# Patient Record
Sex: FEMALE | Race: WHITE | NOT HISPANIC OR LATINO
[De-identification: names, ages, dates, MRNs, and addresses within clinical notes are randomized per-mention and may not be internally consistent; named-entity substitution may affect disease eponyms.]

---

## 2017-04-04 ENCOUNTER — LABORATORY RESULT (OUTPATIENT)
Age: 57
End: 2017-04-04

## 2017-04-04 ENCOUNTER — APPOINTMENT (OUTPATIENT)
Dept: INTERNAL MEDICINE | Facility: CLINIC | Age: 57
End: 2017-04-04

## 2017-04-04 ENCOUNTER — NON-APPOINTMENT (OUTPATIENT)
Age: 57
End: 2017-04-04

## 2017-04-04 VITALS
OXYGEN SATURATION: 97 % | DIASTOLIC BLOOD PRESSURE: 80 MMHG | HEART RATE: 80 BPM | TEMPERATURE: 98 F | SYSTOLIC BLOOD PRESSURE: 130 MMHG | BODY MASS INDEX: 31.98 KG/M2 | HEIGHT: 68 IN | WEIGHT: 211 LBS

## 2017-04-04 DIAGNOSIS — Z87.42 PERSONAL HISTORY OF OTHER DISEASES OF THE FEMALE GENITAL TRACT: ICD-10-CM

## 2017-04-05 LAB
25(OH)D3 SERPL-MCNC: 18.4 NG/ML
ALBUMIN SERPL ELPH-MCNC: 4.7 G/DL
ALP BLD-CCNC: 89 U/L
ALT SERPL-CCNC: 35 U/L
ANION GAP SERPL CALC-SCNC: 15 MMOL/L
APPEARANCE: CLEAR
AST SERPL-CCNC: 33 U/L
BASOPHILS # BLD AUTO: 0.03 K/UL
BASOPHILS NFR BLD AUTO: 0.7 %
BILIRUB SERPL-MCNC: 0.8 MG/DL
BILIRUBIN URINE: NEGATIVE
BLOOD URINE: NEGATIVE
BUN SERPL-MCNC: 18 MG/DL
CALCIUM SERPL-MCNC: 10.4 MG/DL
CHLORIDE SERPL-SCNC: 105 MMOL/L
CHOLEST SERPL-MCNC: 187 MG/DL
CHOLEST/HDLC SERPL: 2.7 RATIO
CK SERPL-CCNC: 75 U/L
CO2 SERPL-SCNC: 23 MMOL/L
COLOR: YELLOW
CREAT SERPL-MCNC: 0.71 MG/DL
EOSINOPHIL # BLD AUTO: 0.3 K/UL
EOSINOPHIL NFR BLD AUTO: 6.8 %
GLUCOSE QUALITATIVE U: NORMAL MG/DL
GLUCOSE SERPL-MCNC: 94 MG/DL
HBA1C MFR BLD HPLC: 5.4 %
HCT VFR BLD CALC: 43 %
HCV AB SER QL: NONREACTIVE
HCV S/CO RATIO: 0.23 S/CO
HDLC SERPL-MCNC: 69 MG/DL
HGB BLD-MCNC: 14.1 G/DL
IMM GRANULOCYTES NFR BLD AUTO: 0 %
KETONES URINE: NEGATIVE
LDLC SERPL CALC-MCNC: 94 MG/DL
LEUKOCYTE ESTERASE URINE: ABNORMAL
LYMPHOCYTES # BLD AUTO: 1.73 K/UL
LYMPHOCYTES NFR BLD AUTO: 39.2 %
MAN DIFF?: NORMAL
MCHC RBC-ENTMCNC: 31.4 PG
MCHC RBC-ENTMCNC: 32.8 GM/DL
MCV RBC AUTO: 95.8 FL
MONOCYTES # BLD AUTO: 0.23 K/UL
MONOCYTES NFR BLD AUTO: 5.2 %
NEUTROPHILS # BLD AUTO: 2.12 K/UL
NEUTROPHILS NFR BLD AUTO: 48.1 %
NITRITE URINE: NEGATIVE
PH URINE: 6.5
PLATELET # BLD AUTO: 181 K/UL
POTASSIUM SERPL-SCNC: 4.4 MMOL/L
PROT SERPL-MCNC: 7.2 G/DL
PROTEIN URINE: NEGATIVE MG/DL
RBC # BLD: 4.49 M/UL
RBC # FLD: 13.7 %
SODIUM SERPL-SCNC: 143 MMOL/L
SPECIFIC GRAVITY URINE: 1.03
TRIGL SERPL-MCNC: 118 MG/DL
TSH SERPL-ACNC: 1.89 UIU/ML
URATE SERPL-MCNC: 4.9 MG/DL
UROBILINOGEN URINE: 1 MG/DL
WBC # FLD AUTO: 4.41 K/UL

## 2018-12-21 ENCOUNTER — APPOINTMENT (OUTPATIENT)
Dept: INTERNAL MEDICINE | Facility: CLINIC | Age: 58
End: 2018-12-21
Payer: COMMERCIAL

## 2018-12-21 ENCOUNTER — LABORATORY RESULT (OUTPATIENT)
Age: 58
End: 2018-12-21

## 2018-12-21 ENCOUNTER — NON-APPOINTMENT (OUTPATIENT)
Age: 58
End: 2018-12-21

## 2018-12-21 VITALS
DIASTOLIC BLOOD PRESSURE: 86 MMHG | SYSTOLIC BLOOD PRESSURE: 130 MMHG | HEART RATE: 86 BPM | OXYGEN SATURATION: 97 % | TEMPERATURE: 98.1 F | HEIGHT: 68 IN | WEIGHT: 215 LBS | BODY MASS INDEX: 32.58 KG/M2

## 2018-12-21 PROCEDURE — 99396 PREV VISIT EST AGE 40-64: CPT | Mod: 25

## 2018-12-21 PROCEDURE — 94010 BREATHING CAPACITY TEST: CPT

## 2018-12-21 PROCEDURE — 93000 ELECTROCARDIOGRAM COMPLETE: CPT

## 2018-12-21 PROCEDURE — 82043 UR ALBUMIN QUANTITATIVE: CPT | Mod: QW

## 2018-12-21 PROCEDURE — 36415 COLL VENOUS BLD VENIPUNCTURE: CPT

## 2018-12-21 PROCEDURE — 92552 PURE TONE AUDIOMETRY AIR: CPT

## 2018-12-21 PROCEDURE — 90674 CCIIV4 VAC NO PRSV 0.5 ML IM: CPT

## 2018-12-21 PROCEDURE — G0008: CPT

## 2018-12-22 LAB
25(OH)D3 SERPL-MCNC: 30.2 NG/ML
ALBUMIN SERPL ELPH-MCNC: 4.9 G/DL
ALBUMIN: 10
ALP BLD-CCNC: 87 U/L
ALT SERPL-CCNC: 25 U/L
ANION GAP SERPL CALC-SCNC: 12 MMOL/L
APPEARANCE: CLEAR
AST SERPL-CCNC: 21 U/L
BASOPHILS # BLD AUTO: 0.02 K/UL
BASOPHILS NFR BLD AUTO: 0.4 %
BILIRUB SERPL-MCNC: 0.6 MG/DL
BILIRUBIN URINE: NEGATIVE
BLOOD URINE: NEGATIVE
BUN SERPL-MCNC: 20 MG/DL
CALCIUM SERPL-MCNC: 11 MG/DL
CHLORIDE SERPL-SCNC: 106 MMOL/L
CHOLEST SERPL-MCNC: 167 MG/DL
CHOLEST/HDLC SERPL: 2.7 RATIO
CK SERPL-CCNC: 74 U/L
CO2 SERPL-SCNC: 24 MMOL/L
COLOR: YELLOW
CREAT SERPL-MCNC: 0.69 MG/DL
CREATININE: 200
EOSINOPHIL # BLD AUTO: 0.21 K/UL
EOSINOPHIL NFR BLD AUTO: 4.2 %
GLUCOSE QUALITATIVE U: NEGATIVE MG/DL
GLUCOSE SERPL-MCNC: 98 MG/DL
HBA1C MFR BLD HPLC: 5.2 %
HCT VFR BLD CALC: 42 %
HDLC SERPL-MCNC: 62 MG/DL
HGB BLD-MCNC: 13.7 G/DL
IMM GRANULOCYTES NFR BLD AUTO: 0 %
KETONES URINE: NEGATIVE
LDLC SERPL CALC-MCNC: 85 MG/DL
LEUKOCYTE ESTERASE URINE: ABNORMAL
LYMPHOCYTES # BLD AUTO: 1.73 K/UL
LYMPHOCYTES NFR BLD AUTO: 34.8 %
MAN DIFF?: NORMAL
MCHC RBC-ENTMCNC: 30.9 PG
MCHC RBC-ENTMCNC: 32.6 GM/DL
MCV RBC AUTO: 94.8 FL
MICROALBUMIN/CREAT UR TEST STR-RTO: <30
MONOCYTES # BLD AUTO: 0.21 K/UL
MONOCYTES NFR BLD AUTO: 4.2 %
NEUTROPHILS # BLD AUTO: 2.8 K/UL
NEUTROPHILS NFR BLD AUTO: 56.4 %
NITRITE URINE: NEGATIVE
PH URINE: 5.5
PLATELET # BLD AUTO: 196 K/UL
POTASSIUM SERPL-SCNC: 4.2 MMOL/L
PROT SERPL-MCNC: 7.2 G/DL
PROTEIN URINE: NEGATIVE MG/DL
RBC # BLD: 4.43 M/UL
RBC # FLD: 13.3 %
SODIUM SERPL-SCNC: 142 MMOL/L
SPECIFIC GRAVITY URINE: 1.02
TRIGL SERPL-MCNC: 100 MG/DL
TSH SERPL-ACNC: 2.13 UIU/ML
URATE SERPL-MCNC: 4.5 MG/DL
UROBILINOGEN URINE: NEGATIVE MG/DL
WBC # FLD AUTO: 4.97 K/UL

## 2018-12-24 ENCOUNTER — CHART COPY (OUTPATIENT)
Age: 58
End: 2018-12-24

## 2018-12-26 ENCOUNTER — CHART COPY (OUTPATIENT)
Age: 58
End: 2018-12-26

## 2018-12-26 ENCOUNTER — CLINICAL ADVICE (OUTPATIENT)
Age: 58
End: 2018-12-26

## 2018-12-26 LAB
CALCIUM SERPL-MCNC: 11.4 MG/DL
PARATHYROID HORMONE INTACT: 54 PG/ML

## 2018-12-28 LAB
24R-OH-CALCIDIOL SERPL-MCNC: 57.2 PG/ML
25(OH)D3 SERPL-MCNC: 30 NG/ML

## 2019-01-02 LAB — PTH RELATED PROT SERPL-MCNC: <2 PMOL/L

## 2019-01-15 LAB — HEMOCCULT STL QL IA: NEGATIVE

## 2019-10-11 ENCOUNTER — APPOINTMENT (OUTPATIENT)
Dept: INTERNAL MEDICINE | Facility: CLINIC | Age: 59
End: 2019-10-11
Payer: COMMERCIAL

## 2019-10-11 VITALS
SYSTOLIC BLOOD PRESSURE: 130 MMHG | DIASTOLIC BLOOD PRESSURE: 86 MMHG | WEIGHT: 211 LBS | HEART RATE: 100 BPM | OXYGEN SATURATION: 97 % | TEMPERATURE: 98.1 F | HEIGHT: 68 IN | BODY MASS INDEX: 31.98 KG/M2

## 2019-10-11 PROCEDURE — 99215 OFFICE O/P EST HI 40 MIN: CPT

## 2020-01-24 ENCOUNTER — APPOINTMENT (OUTPATIENT)
Dept: INTERNAL MEDICINE | Facility: CLINIC | Age: 60
End: 2020-01-24
Payer: COMMERCIAL

## 2020-01-24 ENCOUNTER — LABORATORY RESULT (OUTPATIENT)
Age: 60
End: 2020-01-24

## 2020-01-24 ENCOUNTER — NON-APPOINTMENT (OUTPATIENT)
Age: 60
End: 2020-01-24

## 2020-01-24 VITALS
TEMPERATURE: 98.1 F | OXYGEN SATURATION: 96 % | WEIGHT: 216 LBS | HEIGHT: 68 IN | SYSTOLIC BLOOD PRESSURE: 130 MMHG | BODY MASS INDEX: 32.74 KG/M2 | DIASTOLIC BLOOD PRESSURE: 80 MMHG | HEART RATE: 100 BPM

## 2020-01-24 DIAGNOSIS — N63.0 UNSPECIFIED LUMP IN UNSPECIFIED BREAST: ICD-10-CM

## 2020-01-24 DIAGNOSIS — M19.90 UNSPECIFIED OSTEOARTHRITIS, UNSPECIFIED SITE: ICD-10-CM

## 2020-01-24 LAB
ALBUMIN: NORMAL
CREATININE: NORMAL
HBA1C MFR BLD HPLC: 5
MICROALBUMIN/CREAT UR TEST STR-RTO: NORMAL

## 2020-01-24 PROCEDURE — 92552 PURE TONE AUDIOMETRY AIR: CPT

## 2020-01-24 PROCEDURE — 83036 HEMOGLOBIN GLYCOSYLATED A1C: CPT | Mod: QW

## 2020-01-24 PROCEDURE — 94010 BREATHING CAPACITY TEST: CPT

## 2020-01-24 PROCEDURE — 93000 ELECTROCARDIOGRAM COMPLETE: CPT

## 2020-01-24 PROCEDURE — 82043 UR ALBUMIN QUANTITATIVE: CPT | Mod: QW

## 2020-01-24 PROCEDURE — 36415 COLL VENOUS BLD VENIPUNCTURE: CPT

## 2020-01-24 PROCEDURE — G0008: CPT

## 2020-01-24 PROCEDURE — 90686 IIV4 VACC NO PRSV 0.5 ML IM: CPT

## 2020-01-24 PROCEDURE — 99396 PREV VISIT EST AGE 40-64: CPT | Mod: 25

## 2020-01-29 ENCOUNTER — CHART COPY (OUTPATIENT)
Age: 60
End: 2020-01-29

## 2020-01-31 ENCOUNTER — LABORATORY RESULT (OUTPATIENT)
Age: 60
End: 2020-01-31

## 2020-02-06 ENCOUNTER — APPOINTMENT (OUTPATIENT)
Dept: INTERNAL MEDICINE | Facility: CLINIC | Age: 60
End: 2020-02-06

## 2020-02-07 RX ORDER — COLD-HOT PACK
125 MCG EACH MISCELLANEOUS
Refills: 0 | Status: COMPLETED | COMMUNITY
End: 2020-02-07

## 2020-02-14 LAB — PTH RELATED PROT SERPL-MCNC: <2 PMOL/L

## 2020-02-21 ENCOUNTER — APPOINTMENT (OUTPATIENT)
Dept: INTERNAL MEDICINE | Facility: CLINIC | Age: 60
End: 2020-02-21

## 2020-02-24 LAB
ALBUMIN MFR SERPL ELPH: 61.6 %
ALBUMIN SERPL-MCNC: 4.6 G/DL
ALBUMIN/GLOB SERPL: 1.6 RATIO
ALBUPE: 18.2 %
ALPHA1 GLOB MFR SERPL ELPH: 3.3 %
ALPHA1 GLOB SERPL ELPH-MCNC: 0.2 G/DL
ALPHA1UPE: 29 %
ALPHA2 GLOB MFR SERPL ELPH: 11 %
ALPHA2 GLOB SERPL ELPH-MCNC: 0.8 G/DL
ALPHA2UPE: 20 %
B-GLOBULIN MFR SERPL ELPH: 10.9 %
B-GLOBULIN SERPL ELPH-MCNC: 0.8 G/DL
BETAUPE: 14.3 %
CREAT 24H UR-MCNC: NORMAL G/24 H
CREATININE UR (MAYO): 65 MG/DL
DEPRECATED KAPPA LC FREE/LAMBDA SER: 1.37 RATIO
GAMMA GLOB FLD ELPH-MCNC: 1 G/DL
GAMMA GLOB MFR SERPL ELPH: 13.2 %
GAMMAUPE: 18.5 %
IGA 24H UR QL IFE: NORMAL
INTERPRETATION SERPL IEP-IMP: NORMAL
KAPPA LC 24H UR QL: NORMAL
KAPPA LC CSF-MCNC: 0.98 MG/DL
KAPPA LC SERPL-MCNC: 1.34 MG/DL
PROT PATTERN 24H UR ELPH-IMP: NORMAL
PROT SERPL-MCNC: 7.5 G/DL
PROT SERPL-MCNC: 7.5 G/DL
PROT UR-MCNC: 5 MG/DL
PROT UR-MCNC: 5 MG/DL
SPECIMEN VOL 24H UR: NORMAL ML

## 2020-02-28 LAB
25(OH)D3 SERPL-MCNC: 25.3 NG/ML
ALBUMIN SERPL ELPH-MCNC: 4.8 G/DL
ALP BLD-CCNC: 102 U/L
ALT SERPL-CCNC: 34 U/L
ANION GAP SERPL CALC-SCNC: 12 MMOL/L
APPEARANCE: ABNORMAL
AST SERPL-CCNC: 24 U/L
BASOPHILS # BLD AUTO: 0.05 K/UL
BASOPHILS NFR BLD AUTO: 1.1 %
BILIRUB SERPL-MCNC: 0.5 MG/DL
BILIRUBIN URINE: NEGATIVE
BLOOD URINE: NEGATIVE
BUN SERPL-MCNC: 23 MG/DL
CALCIUM SERPL-MCNC: 10.6 MG/DL
CALCIUM SERPL-MCNC: 10.6 MG/DL
CALCIUM SERPL-MCNC: 10.7 MG/DL
CHLORIDE SERPL-SCNC: 107 MMOL/L
CHOLEST SERPL-MCNC: 188 MG/DL
CHOLEST/HDLC SERPL: 3.1 RATIO
CK SERPL-CCNC: 54 U/L
CO2 SERPL-SCNC: 23 MMOL/L
COLOR: YELLOW
CREAT SERPL-MCNC: 0.77 MG/DL
EOSINOPHIL # BLD AUTO: 0.23 K/UL
EOSINOPHIL NFR BLD AUTO: 4.9 %
ESTIMATED AVERAGE GLUCOSE: 100 MG/DL
GLUCOSE QUALITATIVE U: NEGATIVE
GLUCOSE SERPL-MCNC: 114 MG/DL
HBA1C MFR BLD HPLC: 5.1 %
HCT VFR BLD CALC: 45.7 %
HDLC SERPL-MCNC: 60 MG/DL
HEMOCCULT STL QL IA: NEGATIVE
HGB BLD-MCNC: 14.4 G/DL
IMM GRANULOCYTES NFR BLD AUTO: 0.2 %
KETONES URINE: NEGATIVE
LDLC SERPL CALC-MCNC: 103 MG/DL
LEUKOCYTE ESTERASE URINE: ABNORMAL
LYMPHOCYTES # BLD AUTO: 1.67 K/UL
LYMPHOCYTES NFR BLD AUTO: 35.4 %
MAN DIFF?: NORMAL
MCHC RBC-ENTMCNC: 31.5 GM/DL
MCHC RBC-ENTMCNC: 31.9 PG
MCV RBC AUTO: 101.3 FL
MONOCYTES # BLD AUTO: 0.27 K/UL
MONOCYTES NFR BLD AUTO: 5.7 %
NEUTROPHILS # BLD AUTO: 2.49 K/UL
NEUTROPHILS NFR BLD AUTO: 52.7 %
NITRITE URINE: NEGATIVE
PARATHYROID HORMONE INTACT: 51 PG/ML
PH URINE: 5
PLATELET # BLD AUTO: 192 K/UL
POTASSIUM SERPL-SCNC: 4.4 MMOL/L
PROT SERPL-MCNC: 7.1 G/DL
PROTEIN URINE: NORMAL
RBC # BLD: 4.51 M/UL
RBC # FLD: 13.4 %
SODIUM SERPL-SCNC: 142 MMOL/L
SPECIFIC GRAVITY URINE: 1.02
TRIGL SERPL-MCNC: 121 MG/DL
TSH SERPL-ACNC: 2.3 UIU/ML
URATE SERPL-MCNC: 4.5 MG/DL
UROBILINOGEN URINE: NORMAL
WBC # FLD AUTO: 4.72 K/UL

## 2020-04-01 ENCOUNTER — APPOINTMENT (OUTPATIENT)
Dept: INTERNAL MEDICINE | Facility: CLINIC | Age: 60
End: 2020-04-01

## 2020-12-04 ENCOUNTER — APPOINTMENT (OUTPATIENT)
Dept: COLORECTAL SURGERY | Facility: CLINIC | Age: 60
End: 2020-12-04
Payer: COMMERCIAL

## 2020-12-04 VITALS
DIASTOLIC BLOOD PRESSURE: 87 MMHG | WEIGHT: 216 LBS | BODY MASS INDEX: 32.74 KG/M2 | SYSTOLIC BLOOD PRESSURE: 148 MMHG | RESPIRATION RATE: 14 BRPM | HEART RATE: 86 BPM | TEMPERATURE: 97.7 F | HEIGHT: 68 IN

## 2020-12-04 PROCEDURE — 99072 ADDL SUPL MATRL&STAF TM PHE: CPT

## 2020-12-04 PROCEDURE — 99243 OFF/OP CNSLTJ NEW/EST LOW 30: CPT

## 2020-12-04 NOTE — HISTORY OF PRESENT ILLNESS
[FreeTextEntry1] : Consultation requested by Dr. Ryan Ng for anemia and changes in bowel habits. 60-year-old female recently diagnosed with anemia and also has been having changes in bowel habits over the last several months with different shapes and smaller caliber

## 2020-12-04 NOTE — PHYSICAL EXAM
[Normal Breath Sounds] : Normal breath sounds [Normal Heart Sounds] : normal heart sounds [Normal Rate and Rhythm] : normal rate and rhythm [No Rash or Lesion] : No rash or lesion [Alert] : alert [Oriented to Person] : oriented to person [Oriented to Place] : oriented to place [Oriented to Time] : oriented to time [Calm] : calm [Abdomen Masses] : No abdominal masses [Abdomen Tenderness] : ~T No ~M abdominal tenderness [Tender] : nontender [JVD] : no jugular venous distention  [de-identified] : Looks well in no distress, of stated age. [de-identified] : pupils equal reactive to light normocephalic atraumatic. [de-identified] : moves all 4

## 2020-12-04 NOTE — REVIEW OF SYSTEMS
[As Noted in HPI] : as noted in HPI [Feeling Poorly] : feeling poorly [Feeling Tired] : feeling tired [Negative] : Heme/Lymph

## 2020-12-04 NOTE — ASSESSMENT
[FreeTextEntry1] : 60 year old female With changes in bowel habits and anemia. Recommend colonoscopy. Risks and benefits of colonoscopy have been discussed which include but not limited to bleeding, perforation, missing a cancer or polyp occurring 5%.\par

## 2020-12-04 NOTE — CONSULT LETTER
[Dear  ___] : Dear  [unfilled], [Consult Letter:] : I had the pleasure of evaluating your patient, [unfilled]. [Please see my note below.] : Please see my note below. [Consult Closing:] : Thank you very much for allowing me to participate in the care of this patient.  If you have any questions, please do not hesitate to contact me. [Sincerely,] : Sincerely, [FreeTextEntry3] : Eduardo Man M.D. FACS, FASCRS

## 2021-01-15 DIAGNOSIS — Z01.818 ENCOUNTER FOR OTHER PREPROCEDURAL EXAMINATION: ICD-10-CM

## 2021-01-16 ENCOUNTER — APPOINTMENT (OUTPATIENT)
Dept: DISASTER EMERGENCY | Facility: CLINIC | Age: 61
End: 2021-01-16

## 2021-01-18 LAB — SARS-COV-2 N GENE NPH QL NAA+PROBE: NOT DETECTED

## 2021-01-21 ENCOUNTER — RESULT REVIEW (OUTPATIENT)
Age: 61
End: 2021-01-21

## 2021-01-21 ENCOUNTER — APPOINTMENT (OUTPATIENT)
Dept: COLORECTAL SURGERY | Facility: AMBULATORY MEDICAL SERVICES | Age: 61
End: 2021-01-21
Payer: COMMERCIAL

## 2021-01-21 PROCEDURE — 45380 COLONOSCOPY AND BIOPSY: CPT

## 2021-01-29 ENCOUNTER — NON-APPOINTMENT (OUTPATIENT)
Age: 61
End: 2021-01-29

## 2021-06-21 ENCOUNTER — APPOINTMENT (OUTPATIENT)
Dept: INTERNAL MEDICINE | Facility: CLINIC | Age: 61
End: 2021-06-21

## 2021-08-16 ENCOUNTER — APPOINTMENT (OUTPATIENT)
Dept: INTERNAL MEDICINE | Facility: CLINIC | Age: 61
End: 2021-08-16

## 2022-08-22 ENCOUNTER — NON-APPOINTMENT (OUTPATIENT)
Age: 62
End: 2022-08-22

## 2022-08-22 ENCOUNTER — APPOINTMENT (OUTPATIENT)
Dept: INTERNAL MEDICINE | Facility: CLINIC | Age: 62
End: 2022-08-22

## 2022-08-22 VITALS
OXYGEN SATURATION: 97 % | HEART RATE: 88 BPM | BODY MASS INDEX: 32.43 KG/M2 | WEIGHT: 214 LBS | RESPIRATION RATE: 15 BRPM | HEIGHT: 68 IN | SYSTOLIC BLOOD PRESSURE: 125 MMHG | TEMPERATURE: 97.3 F | DIASTOLIC BLOOD PRESSURE: 85 MMHG

## 2022-08-22 DIAGNOSIS — Z23 ENCOUNTER FOR IMMUNIZATION: ICD-10-CM

## 2022-08-22 DIAGNOSIS — Z86.79 PERSONAL HISTORY OF OTHER DISEASES OF THE CIRCULATORY SYSTEM: ICD-10-CM

## 2022-08-22 LAB
25(OH)D3 SERPL-MCNC: 67.2 NG/ML
ALBUMIN SERPL ELPH-MCNC: 5 G/DL
ALP BLD-CCNC: 80 U/L
ALT SERPL-CCNC: 36 U/L
ANION GAP SERPL CALC-SCNC: 9 MMOL/L
AST SERPL-CCNC: 22 U/L
BASOPHILS # BLD AUTO: 0.04 K/UL
BASOPHILS NFR BLD AUTO: 1 %
BILIRUB SERPL-MCNC: 0.8 MG/DL
BUN SERPL-MCNC: 18 MG/DL
CALCIUM SERPL-MCNC: 10.5 MG/DL
CHLORIDE SERPL-SCNC: 108 MMOL/L
CHOLEST SERPL-MCNC: 173 MG/DL
CO2 SERPL-SCNC: 25 MMOL/L
CREAT SERPL-MCNC: 0.79 MG/DL
EGFR: 83 ML/MIN/1.73M2
EOSINOPHIL # BLD AUTO: 0.21 K/UL
EOSINOPHIL NFR BLD AUTO: 5.1 %
ESTIMATED AVERAGE GLUCOSE: 100 MG/DL
GLUCOSE SERPL-MCNC: 106 MG/DL
HBA1C MFR BLD HPLC: 5.1 %
HCT VFR BLD CALC: 40.9 %
HDLC SERPL-MCNC: 66 MG/DL
HGB BLD-MCNC: 14 G/DL
IMM GRANULOCYTES NFR BLD AUTO: 0.2 %
LDLC SERPL CALC-MCNC: 92 MG/DL
LYMPHOCYTES # BLD AUTO: 1.94 K/UL
LYMPHOCYTES NFR BLD AUTO: 47.2 %
MAN DIFF?: NORMAL
MCHC RBC-ENTMCNC: 32 PG
MCHC RBC-ENTMCNC: 34.2 GM/DL
MCV RBC AUTO: 93.6 FL
MONOCYTES # BLD AUTO: 0.28 K/UL
MONOCYTES NFR BLD AUTO: 6.8 %
NEUTROPHILS # BLD AUTO: 1.63 K/UL
NEUTROPHILS NFR BLD AUTO: 39.7 %
NONHDLC SERPL-MCNC: 108 MG/DL
PLATELET # BLD AUTO: 187 K/UL
POTASSIUM SERPL-SCNC: 5 MMOL/L
PROT SERPL-MCNC: 7.2 G/DL
RBC # BLD: 4.37 M/UL
RBC # FLD: 13.4 %
SODIUM SERPL-SCNC: 142 MMOL/L
TRIGL SERPL-MCNC: 76 MG/DL
TSH SERPL-ACNC: 1.89 UIU/ML
WBC # FLD AUTO: 4.11 K/UL

## 2022-08-22 PROCEDURE — 99396 PREV VISIT EST AGE 40-64: CPT | Mod: 25

## 2022-08-22 PROCEDURE — 36415 COLL VENOUS BLD VENIPUNCTURE: CPT

## 2022-08-22 PROCEDURE — 93000 ELECTROCARDIOGRAM COMPLETE: CPT | Mod: 59

## 2022-08-22 PROCEDURE — G0444 DEPRESSION SCREEN ANNUAL: CPT | Mod: 59

## 2022-08-22 PROCEDURE — G0447 BEHAVIOR COUNSEL OBESITY 15M: CPT

## 2022-08-22 RX ORDER — ETANERCEPT 50 MG/ML
50 SOLUTION SUBCUTANEOUS
Qty: 4 | Refills: 0 | Status: ACTIVE | COMMUNITY
Start: 2022-08-16

## 2022-08-22 RX ORDER — HYDROXYCHLOROQUINE SULFATE 200 MG/1
200 TABLET, FILM COATED ORAL
Qty: 180 | Refills: 0 | Status: ACTIVE | COMMUNITY
Start: 2022-03-24

## 2022-08-22 RX ORDER — ESTRADIOL 10 UG/1
10 INSERT VAGINAL
Qty: 24 | Refills: 0 | Status: ACTIVE | COMMUNITY
Start: 2021-12-28

## 2022-08-22 RX ORDER — PREDNISONE 5 MG/1
5 TABLET ORAL
Qty: 30 | Refills: 0 | Status: DISCONTINUED | COMMUNITY
Start: 2022-04-04

## 2022-08-22 NOTE — PHYSICAL EXAM
[No Abdominal Bruit] : a ~M bruit was not heard ~T in the abdomen [No Edema] : there was no peripheral edema [No Palpable Aorta] : no palpable aorta [Coordination Grossly Intact] : coordination grossly intact [No Focal Deficits] : no focal deficits [Normal Gait] : normal gait [Normal] : affect was normal and insight and judgment were intact [de-identified] : + varicosities

## 2022-08-22 NOTE — ASSESSMENT
[FreeTextEntry1] : Antoinette is a 63 yo F w PMHx RA \par \par Meds (OTC) \par Vit C \par Cod liver oil \par B complex 100 mg \par Lysine 500 mg \par \par Surgeries\par Vascular surgery 2021 \par Had oopherectomy for cyst, has sonos q6 mo Dr. Anuja Murray\par \par FHx \par Both maternal and paternal grandmothers breast cancer (after age 60) \par Mother lung cancer and CAD (heavy smoker) \par \par Specialist \par Dr. Magi Harris- rheum \par Dr. Eduardo Blanco- GI\par Dr. Murray Ob gyn \par Dr. Juarez Breast surgeon \par \par HCM \par Had lumpectomy with benign results (2019) Follows with Dr. Juarez, breast surgeon. Has mammo/sono -  3/2022. Has MRI w/wo breast q6 months \par Pap smear- normal as per pt 2022 \par DEXA- normal as per pt (2020)- she has appt for repeat. Ordered by Dr. Knox\par Immunizations- 2/2 moderna vaccines 2021- missing booster. Patient states that after second shot she has had increasing joint pains. \par Negative depression screening \par EKG nsr \par Flu vaccine administered today

## 2022-08-22 NOTE — HISTORY OF PRESENT ILLNESS
[FreeTextEntry1] : cpe  [de-identified] : Antoinette is a 63 yo F w PMHx RA, ovarian cysts s/p oophorectomy, intraductal papilloma of right breast s/p excisional biopsy \par \par Meds (OTC) \par Vit C \par Cod liver oil \par B complex 100 mg \par Lysine 500 mg \par \par Surgeries\par Vascular surgery 2021 \par Had oopherectomy for cyst, has sonos q6 mo Dr. Anuja Murray\par \par FHx \par Both maternal and paternal grandmothers breast cancer (after age 60) \par Mother lung cancer and CAD (heavy smoker) \par \par Specialist \par Dr. Magi Lopez- rheum- RA \par Dr. Eduardo Blanco- GI\par Dr. Murray Ob gyn \par Dr. Juarez Breast surgeon \par \par Had lumpectomy with benign results (2019) Follows with Dr. Juarez, breast surgeon. Has mammo/sono -  3/2022. Has MRI w/wo breast q6 months \par Pap smear- normal as per pt 2022 \par DEXA- normal as per pt (2020)- she has appt for repeat. Ordered by Dr. Knox\par Immunizations- 2/2 moderna vaccines 2021- missing booster. Patient states that after second shot she has had increasing joint pains. \par \par Patient feels well and has no acute complaints during this visit \par

## 2022-08-22 NOTE — COUNSELING
[Potential consequences of obesity discussed] : Potential consequences of obesity discussed [Benefits of weight loss discussed] : Benefits of weight loss discussed [Encouraged to maintain food diary] : Encouraged to maintain food diary [Encouraged to increase physical activity] : Encouraged to increase physical activity [Encouraged to use exercise tracking device] : Encouraged to use exercise tracking device [Target Wt Loss Goal ___] : Weight Loss Goals: Target weight loss goal [unfilled] lbs [Weigh Self Weekly] : weigh self weekly [Decrease Portions] : decrease portions [____ min/wk Activity] : [unfilled] min/wk activity [Keep Food Diary] : keep food diary [Good understanding] : Patient has a good understanding of disease, goals and obesity follow-up plan [FreeTextEntry2] :  Explained amount of protein fat and carbs that are allowed on eat meal. Needs also to follow a caloric deficit. Good sources of good fats such as avocado, nuts, fish, good source of protein eggs, white part of chicken, beef from time to time. Good source of carbs low carb wraps, green veggies etc... also needs to start exercising. Recommended around 45 min 4-5 times a week of moderate intensity work outs. Patient understood and agreed with plan.\par  [FreeTextEntry4] : 15

## 2022-08-22 NOTE — HEALTH RISK ASSESSMENT
[Patient reported mammogram was normal] : Patient reported mammogram was normal [Patient reported PAP Smear was normal] : Patient reported PAP Smear was normal [Excellent] : ~his/her~  mood as  excellent [Never] : Never [Yes] : Yes [2 - 3 times a week (3 pts)] : 2 - 3  times a week (3 points) [1 or 2 (0 pts)] : 1 or 2 (0 points) [Never (0 pts)] : Never (0 points) [No] : In the past 12 months have you used drugs other than those required for medical reasons? No [0] : 2) Feeling down, depressed, or hopeless: Not at all (0) [PHQ-2 Negative - No further assessment needed] : PHQ-2 Negative - No further assessment needed [No Retinopathy] : No retinopathy [Patient reported bone density results were normal] : Patient reported bone density results were normal [Patient reported colonoscopy was normal] : Patient reported colonoscopy was normal [HIV test declined] : HIV test declined [Hepatitis C test declined] : Hepatitis C test declined [None] : None [With Significant Other] : lives with significant other [# of Members in Household ___] :  household currently consist of [unfilled] member(s) [Unemployed] : unemployed [] :  [# Of Children ___] : has [unfilled] children [Sexually Active] : sexually active [Feels Safe at Home] : Feels safe at home [Fully functional (bathing, dressing, toileting, transferring, walking, feeding)] : Fully functional (bathing, dressing, toileting, transferring, walking, feeding) [Fully functional (using the telephone, shopping, preparing meals, housekeeping, doing laundry, using] : Fully functional and needs no help or supervision to perform IADLs (using the telephone, shopping, preparing meals, housekeeping, doing laundry, using transportation, managing medications and managing finances) [With Patient/Caregiver] : , with patient/caregiver [Designated Healthcare Proxy] : Designated healthcare proxy [Name: ___] : Health Care Proxy's Name: [unfilled]  [Relationship: ___] : Relationship: [unfilled] [Aggressive treatment] : aggressive treatment [I will adhere to the patient's wishes.] : I will adhere to the patient's wishes. [Audit-CScore] : 3 [de-identified] : Gym 2x a week  [de-identified] : Could improve- could cut back on the carbs  [[id number]] : 0 [EyeExamDate] : 2021 [Change in mental status noted] : No change in mental status noted [Language] : denies difficulty with language [Behavior] : denies difficulty with behavior [Learning/Retaining New Information] : denies difficulty learning/retaining new information [Handling Complex Tasks] : denies difficulty handling complex tasks [Reasoning] : denies difficulty with reasoning [Spatial Ability and Orientation] : denies difficulty with spatial ability and orientation [Reports changes in hearing] : Reports no changes in hearing [Reports changes in vision] : Reports no changes in vision [MammogramDate] : 3/2022 [PapSmearDate] : 2022 [BoneDensityDate] : 2020 [ColonoscopyDate] : 2020 [ColonoscopyComments] : ADVISED TO FOLLOW UP IN 2026  [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

## 2022-08-23 ENCOUNTER — NON-APPOINTMENT (OUTPATIENT)
Age: 62
End: 2022-08-23

## 2022-08-24 LAB
APPEARANCE: CLEAR
BACTERIA: NEGATIVE
BILIRUBIN URINE: NEGATIVE
BLOOD URINE: NEGATIVE
COLOR: YELLOW
GLUCOSE QUALITATIVE U: NEGATIVE
HYALINE CASTS: 6 /LPF
KETONES URINE: NEGATIVE
LEUKOCYTE ESTERASE URINE: ABNORMAL
MICROSCOPIC-UA: NORMAL
NITRITE URINE: NEGATIVE
PH URINE: 5.5
PROTEIN URINE: NORMAL
RED BLOOD CELLS URINE: 2 /HPF
SPECIFIC GRAVITY URINE: 1.02
SQUAMOUS EPITHELIAL CELLS: 9 /HPF
UROBILINOGEN URINE: NORMAL
WHITE BLOOD CELLS URINE: 4 /HPF

## 2022-11-22 ENCOUNTER — APPOINTMENT (OUTPATIENT)
Dept: INTERNAL MEDICINE | Facility: CLINIC | Age: 62
End: 2022-11-22

## 2022-11-22 VITALS — WEIGHT: 215 LBS | RESPIRATION RATE: 15 BRPM | BODY MASS INDEX: 32.58 KG/M2 | HEIGHT: 68 IN

## 2022-11-22 VITALS
SYSTOLIC BLOOD PRESSURE: 137 MMHG | OXYGEN SATURATION: 97 % | HEART RATE: 67 BPM | DIASTOLIC BLOOD PRESSURE: 89 MMHG | TEMPERATURE: 97.5 F

## 2022-11-22 PROCEDURE — 99213 OFFICE O/P EST LOW 20 MIN: CPT

## 2022-11-22 NOTE — HISTORY OF PRESENT ILLNESS
[FreeTextEntry1] : fu medical hx  [de-identified] : Antoinette is a 61 yo F w PMHx RA, ovarian cysts s/p oophorectomy, intraductal papilloma of right breast s/p excisional biopsy \par Patient had bw w rheum, she was discontinued from calcium. Last bw 11/1/2022- bw was normal \par MRI breast- 11/2022- normal \par DEXA scan pending- 11/28/2022\par Monday will be going for intravaginal US- negative CA \par

## 2022-11-22 NOTE — ASSESSMENT
[FreeTextEntry1] : Antoinette is a 63 yo F w PMHx RA, ovarian cysts s/p oophorectomy, intraductal papilloma of right breast s/p excisional biopsy \par Patient had bw w rheum, she was discontinued from calcium. Last bw 11/1/2022- bw was normal \par MRI breast- 11/2022- normal \par DEXA scan pending- 11/28/2022\par Monday will be going for intravaginal US- negative CA \par \par BMI 32 \par  Explained amount of protein fat and carbs that are allowed on eat meal. Needs also to follow a caloric deficit. He will start using [employer] it roxane to log food and track calories, carbs and fats. Good sources of good fats such as avocado, nuts, fish, good source of protein eggs, white part of chicken, beef from time to time. Good source of carbs low carb wraps, green veggies etc... also needs to start exercising. Recommended around 45 min 4-5 times a week of moderate intensity work outs. Patient understood and agreed with plan. \par \par RTO 6 MO

## 2023-05-22 ENCOUNTER — APPOINTMENT (OUTPATIENT)
Dept: INTERNAL MEDICINE | Facility: CLINIC | Age: 63
End: 2023-05-22
Payer: COMMERCIAL

## 2023-05-22 VITALS
OXYGEN SATURATION: 98 % | SYSTOLIC BLOOD PRESSURE: 138 MMHG | TEMPERATURE: 97 F | HEART RATE: 74 BPM | DIASTOLIC BLOOD PRESSURE: 84 MMHG | BODY MASS INDEX: 32.74 KG/M2 | HEIGHT: 68 IN | WEIGHT: 216 LBS

## 2023-05-22 DIAGNOSIS — Z00.00 ENCOUNTER FOR GENERAL ADULT MEDICAL EXAMINATION W/OUT ABNORMAL FINDINGS: ICD-10-CM

## 2023-05-22 PROCEDURE — 99214 OFFICE O/P EST MOD 30 MIN: CPT

## 2023-05-22 NOTE — HISTORY OF PRESENT ILLNESS
[FreeTextEntry1] : follow up  [de-identified] : Antoinette is a 61 yo F w PMHx RA, ovarian cysts s/p oophorectomy, intraductal papilloma of right breast s/p excisional biopsy \par \par Patient stopped taking calcium as her levels were high, she noticed a lot of her supplements also calcium. \par COD liver oil \par Vit D3 daily (does not remember IU) \par Vit C \par Lysine \par \par HCM \par 1/18/2023- DEXA- normal bone density \par Gyn (Dr. Salomon) 2/2023 - transvaginal US (normal) \par Sono/Mammo- 4/2023- normal \par Patient has blood work through rheum- we will attempt to retrieve records. \par Exercises three times a week (gym- 20 min treadmill, weight training) \par Diet- could improve

## 2023-08-22 ENCOUNTER — APPOINTMENT (OUTPATIENT)
Dept: INTERNAL MEDICINE | Facility: CLINIC | Age: 63
End: 2023-08-22
Payer: COMMERCIAL

## 2023-08-22 VITALS
RESPIRATION RATE: 16 BRPM | TEMPERATURE: 97.3 F | HEIGHT: 68 IN | HEART RATE: 72 BPM | OXYGEN SATURATION: 97 % | DIASTOLIC BLOOD PRESSURE: 80 MMHG | SYSTOLIC BLOOD PRESSURE: 126 MMHG

## 2023-08-22 DIAGNOSIS — F41.9 ANXIETY DISORDER, UNSPECIFIED: ICD-10-CM

## 2023-08-22 PROCEDURE — 36415 COLL VENOUS BLD VENIPUNCTURE: CPT

## 2023-08-22 PROCEDURE — 99214 OFFICE O/P EST MOD 30 MIN: CPT | Mod: 25

## 2023-08-22 NOTE — HISTORY OF PRESENT ILLNESS
[FreeTextEntry1] : fu ANXIETY  [de-identified] : Antoinette is a 61 yo F w PMHx RA, ovarian cysts s/p oophorectomy, intraductal papilloma of right breast s/p excisional biopsy  Patient was having anxiety and crying spell in July, she spoke to rheum to see if one of her medications might be causing these symptoms. Rheumatologist decreased hydroxychloroquine to one pill daily.  Anxiety has greatly improved, she does not know if it was the decrease of hydrchloroquine or the mushroom tea she started at the same time (OTC chagas mushroom tea)   hcm 1/18/2023- DEXA- normal bone density  Gyn (Dr. Salomon) 2/2023 - transvaginal US (normal)  Sono/Mammo- 4/2023- normal  Patient has blood work through rheum- Exercises three times a week (gym- 20 min treadmill, weight training)

## 2023-08-22 NOTE — ASSESSMENT
[FreeTextEntry1] : Antoinette is a 63 yo F w PMHx RA, ovarian cysts s/p oophorectomy, intraductal papilloma of right breast s/p excisional biopsy  Patient was having anxiety and crying spell in July, she spoke to rheum to see if one of her medications might be causing these symptoms. Rheumatologist decreased hydroxychloroquine to one pill daily.  Anxiety has greatly improved, she does not know if it was the decrease of hydrchloroquine or the mushroom tea she started at the same time (OTC chagas mushroom tea)   hcm 1/18/2023- DEXA- normal bone density  Gyn (Dr. Salomon) 2/2023 - transvaginal US (normal)  Sono/Mammo- 4/2023- normal  Patient has blood work through rheum- Exercises three times a week (gym- 20 min treadmill, weight training)   Anxiety  Advised patient to d/c OTC tea in order to determine if decreased dose of medication is the reason for decreased anxiety. I also explained that considering she is on many medications for he RA she should be very careful with OTC/non fda approved supplements as medication interactions have not been well studies.  She admits she has suffered from anxiety for the past two years since diagnosis of RA but felt anxiety levels were disproportionate recently. I have discussed conservative treatment for anxiety including therapy/medication/yoga. We can consider pharmacotherapy and would feel more comfortable treating her anxiety with well known studied medications that do not interact with her medicine. Patient understood and agreed with plan.  Will check organic causes for anxiety through bw as well   Provided instructions for portal and advised patient to feel free to communicate with me through portal.   rto 3 mo, sooner if needed

## 2023-08-24 LAB
25(OH)D3 SERPL-MCNC: 23.4 NG/ML
ALBUMIN SERPL ELPH-MCNC: 5.1 G/DL
ALP BLD-CCNC: 89 U/L
ALT SERPL-CCNC: 26 U/L
ANION GAP SERPL CALC-SCNC: 13 MMOL/L
AST SERPL-CCNC: 30 U/L
BILIRUB SERPL-MCNC: 1 MG/DL
BUN SERPL-MCNC: 20 MG/DL
CALCIUM SERPL-MCNC: 10.9 MG/DL
CHLORIDE SERPL-SCNC: 106 MMOL/L
CO2 SERPL-SCNC: 24 MMOL/L
CREAT SERPL-MCNC: 0.78 MG/DL
EGFR: 84 ML/MIN/1.73M2
FOLATE SERPL-MCNC: 14 NG/ML
GLUCOSE SERPL-MCNC: 102 MG/DL
IRON SATN MFR SERPL: 50 %
IRON SERPL-MCNC: 147 UG/DL
POTASSIUM SERPL-SCNC: 4.3 MMOL/L
PROT SERPL-MCNC: 7.6 G/DL
SODIUM SERPL-SCNC: 143 MMOL/L
T3 SERPL-MCNC: 115 NG/DL
T3FREE SERPL-MCNC: 3.27 PG/ML
T4 FREE SERPL-MCNC: 1.2 NG/DL
T4 SERPL-MCNC: 6.5 UG/DL
TIBC SERPL-MCNC: 296 UG/DL
TSH SERPL-ACNC: 1.68 UIU/ML
UIBC SERPL-MCNC: 148 UG/DL
VIT B12 SERPL-MCNC: <150 PG/ML

## 2023-09-11 ENCOUNTER — NON-APPOINTMENT (OUTPATIENT)
Age: 63
End: 2023-09-11

## 2023-09-11 ENCOUNTER — APPOINTMENT (OUTPATIENT)
Dept: INTERNAL MEDICINE | Facility: CLINIC | Age: 63
End: 2023-09-11
Payer: COMMERCIAL

## 2023-09-11 PROCEDURE — 96372 THER/PROPH/DIAG INJ SC/IM: CPT

## 2023-09-11 RX ORDER — CYANOCOBALAMIN 1000 UG/ML
1000 INJECTION INTRAMUSCULAR; SUBCUTANEOUS
Qty: 0 | Refills: 0 | Status: COMPLETED | OUTPATIENT
Start: 2023-09-11

## 2023-09-11 RX ADMIN — CYANOCOBALAMIN 1 MCG/ML: 1000 INJECTION INTRAMUSCULAR; SUBCUTANEOUS at 00:00

## 2023-10-11 ENCOUNTER — APPOINTMENT (OUTPATIENT)
Dept: INTERNAL MEDICINE | Facility: CLINIC | Age: 63
End: 2023-10-11
Payer: COMMERCIAL

## 2023-10-11 PROCEDURE — 90662 IIV NO PRSV INCREASED AG IM: CPT

## 2023-10-11 PROCEDURE — 96372 THER/PROPH/DIAG INJ SC/IM: CPT

## 2023-10-11 PROCEDURE — G0008: CPT

## 2023-10-11 RX ORDER — CYANOCOBALAMIN 1000 UG/ML
1000 INJECTION INTRAMUSCULAR; SUBCUTANEOUS
Qty: 0 | Refills: 0 | Status: COMPLETED | OUTPATIENT
Start: 2023-10-10

## 2023-11-13 ENCOUNTER — APPOINTMENT (OUTPATIENT)
Dept: INTERNAL MEDICINE | Facility: CLINIC | Age: 63
End: 2023-11-13
Payer: COMMERCIAL

## 2023-11-13 PROCEDURE — 96372 THER/PROPH/DIAG INJ SC/IM: CPT

## 2023-11-13 RX ADMIN — CYANOCOBALAMIN 0 MCG/ML: 1000 INJECTION INTRAMUSCULAR; SUBCUTANEOUS at 00:00

## 2023-12-19 ENCOUNTER — MED ADMIN CHARGE (OUTPATIENT)
Age: 63
End: 2023-12-19

## 2023-12-19 ENCOUNTER — APPOINTMENT (OUTPATIENT)
Dept: INTERNAL MEDICINE | Facility: CLINIC | Age: 63
End: 2023-12-19
Payer: COMMERCIAL

## 2023-12-19 PROCEDURE — 96372 THER/PROPH/DIAG INJ SC/IM: CPT

## 2024-01-19 ENCOUNTER — LABORATORY RESULT (OUTPATIENT)
Age: 64
End: 2024-01-19

## 2024-01-19 ENCOUNTER — APPOINTMENT (OUTPATIENT)
Dept: INTERNAL MEDICINE | Facility: CLINIC | Age: 64
End: 2024-01-19
Payer: COMMERCIAL

## 2024-01-19 VITALS
RESPIRATION RATE: 16 BRPM | HEIGHT: 68 IN | HEART RATE: 89 BPM | BODY MASS INDEX: 31.83 KG/M2 | SYSTOLIC BLOOD PRESSURE: 137 MMHG | DIASTOLIC BLOOD PRESSURE: 87 MMHG | TEMPERATURE: 97.3 F | OXYGEN SATURATION: 95 % | WEIGHT: 210 LBS

## 2024-01-19 DIAGNOSIS — R35.0 FREQUENCY OF MICTURITION: ICD-10-CM

## 2024-01-19 DIAGNOSIS — E83.52 HYPERCALCEMIA: ICD-10-CM

## 2024-01-19 PROCEDURE — 99214 OFFICE O/P EST MOD 30 MIN: CPT | Mod: 25

## 2024-01-19 PROCEDURE — 36415 COLL VENOUS BLD VENIPUNCTURE: CPT

## 2024-01-19 NOTE — HISTORY OF PRESENT ILLNESS
[FreeTextEntry1] : Anxiety /b12 [de-identified] : Antoinette is a 62 yo F w PMHx RA, ovarian cysts s/p oophorectomy, intraductal papilloma of right breast s/p excisional biopsy  Patient had a sore throat was treat with amox and states she felt better but then developed sore thorat, cough and was then started on azithromycin which she just finished but now having some has urinary urgency and some discomfort when she urinates, now using AZO  Has had to miss her RA medication for two weeks  Anxiety is now very well controlled.    hcm 1/18/2023- DEXA- normal bone density  Gyn (Dr. Salomon) 2/2023 - transvaginal US (normal)  Sono/Mammo- 4/2023- normal  Patient has blood work through rheum- Exercises three times a week (gym- 20 min treadmill, weight training)

## 2024-01-19 NOTE — ASSESSMENT
[FreeTextEntry1] : Antoinette is a 64 yo F w PMHx RA, ovarian cysts s/p oophorectomy, intraductal papilloma of right breast s/p excisional biopsy  Patient had a sore throat was treat with amox and states she felt better but then developed sore thorat, cough and was then started on azithromycin which she just finished but now having some has urinary urgency and some discomfort when she urinates, now using AZO  Has had to miss her RA medication for two weeks  Anxiety is now very well controlled.    hcm 1/18/2023- DEXA- normal bone density  Gyn (Dr. Salomon) 2/2023 - transvaginal US (normal)  Sono/Mammo- 4/2023- normal  Patient has blood work through rheum- Exercises three times a week (gym- 20 min treadmill, weight training)   Anxiety  Well controlled   Frequency  Fu UA w culture   B12 deficiency  fu b12 levels   rto 3 mo

## 2024-01-21 LAB
ALBUMIN SERPL ELPH-MCNC: 4.7 G/DL
ALP BLD-CCNC: 171 U/L
ALT SERPL-CCNC: 45 U/L
ANION GAP SERPL CALC-SCNC: 12 MMOL/L
APPEARANCE: ABNORMAL
AST SERPL-CCNC: 35 U/L
BASOPHILS # BLD AUTO: 0.04 K/UL
BASOPHILS NFR BLD AUTO: 0.5 %
BILIRUB SERPL-MCNC: 0.4 MG/DL
BILIRUBIN URINE: NEGATIVE
BLOOD URINE: ABNORMAL
BUN SERPL-MCNC: 16 MG/DL
CALCIUM SERPL-MCNC: 10.8 MG/DL
CHLORIDE SERPL-SCNC: 106 MMOL/L
CO2 SERPL-SCNC: 24 MMOL/L
COLOR: YELLOW
CREAT SERPL-MCNC: 0.74 MG/DL
EGFR: 90 ML/MIN/1.73M2
EOSINOPHIL # BLD AUTO: 0.22 K/UL
EOSINOPHIL NFR BLD AUTO: 2.9 %
FOLATE SERPL-MCNC: 16.5 NG/ML
GLUCOSE QUALITATIVE U: NEGATIVE MG/DL
GLUCOSE SERPL-MCNC: 97 MG/DL
HCT VFR BLD CALC: 43.6 %
HGB BLD-MCNC: 14.7 G/DL
IMM GRANULOCYTES NFR BLD AUTO: 0.3 %
IRON SATN MFR SERPL: 39 %
IRON SERPL-MCNC: 109 UG/DL
KETONES URINE: NEGATIVE MG/DL
LEUKOCYTE ESTERASE URINE: ABNORMAL
LYMPHOCYTES # BLD AUTO: 1.74 K/UL
LYMPHOCYTES NFR BLD AUTO: 22.9 %
MAN DIFF?: NORMAL
MCHC RBC-ENTMCNC: 31.5 PG
MCHC RBC-ENTMCNC: 33.7 GM/DL
MCV RBC AUTO: 93.4 FL
MONOCYTES # BLD AUTO: 0.47 K/UL
MONOCYTES NFR BLD AUTO: 6.2 %
NEUTROPHILS # BLD AUTO: 5.11 K/UL
NEUTROPHILS NFR BLD AUTO: 67.2 %
NITRITE URINE: NEGATIVE
PH URINE: 6
PLATELET # BLD AUTO: 283 K/UL
POTASSIUM SERPL-SCNC: 4.4 MMOL/L
PROT SERPL-MCNC: 7.3 G/DL
PROTEIN URINE: 30 MG/DL
RBC # BLD: 4.67 M/UL
RBC # FLD: 12.5 %
SODIUM SERPL-SCNC: 142 MMOL/L
SPECIFIC GRAVITY URINE: 1.01
TIBC SERPL-MCNC: 282 UG/DL
UIBC SERPL-MCNC: 173 UG/DL
UROBILINOGEN URINE: 0.2 MG/DL
VIT B12 SERPL-MCNC: 194 PG/ML
WBC # FLD AUTO: 7.6 K/UL

## 2024-01-22 DIAGNOSIS — R39.9 UNSPECIFIED SYMPTOMS AND SIGNS INVOLVING THE GENITOURINARY SYSTEM: ICD-10-CM

## 2024-01-22 RX ORDER — CIPROFLOXACIN HYDROCHLORIDE 500 MG/1
500 TABLET, FILM COATED ORAL
Qty: 6 | Refills: 0 | Status: ACTIVE | COMMUNITY
Start: 2024-01-22 | End: 1900-01-01

## 2024-01-29 ENCOUNTER — APPOINTMENT (OUTPATIENT)
Dept: INTERNAL MEDICINE | Facility: CLINIC | Age: 64
End: 2024-01-29
Payer: COMMERCIAL

## 2024-01-29 ENCOUNTER — NON-APPOINTMENT (OUTPATIENT)
Age: 64
End: 2024-01-29

## 2024-01-29 PROCEDURE — 96372 THER/PROPH/DIAG INJ SC/IM: CPT

## 2024-01-29 RX ADMIN — CYANOCOBALAMIN 0 MCG/ML: 1000 INJECTION INTRAMUSCULAR; SUBCUTANEOUS at 00:00

## 2024-01-30 RX ORDER — CYANOCOBALAMIN 1000 UG/ML
1000 INJECTION INTRAMUSCULAR; SUBCUTANEOUS
Qty: 0 | Refills: 0 | Status: COMPLETED | OUTPATIENT
Start: 2024-01-29

## 2024-02-27 ENCOUNTER — APPOINTMENT (OUTPATIENT)
Dept: INTERNAL MEDICINE | Facility: CLINIC | Age: 64
End: 2024-02-27
Payer: COMMERCIAL

## 2024-02-27 PROCEDURE — 96372 THER/PROPH/DIAG INJ SC/IM: CPT

## 2024-02-27 RX ORDER — CYANOCOBALAMIN 1000 UG/ML
1000 INJECTION INTRAMUSCULAR; SUBCUTANEOUS
Qty: 0 | Refills: 0 | Status: COMPLETED | OUTPATIENT
Start: 2023-12-19

## 2024-02-27 RX ORDER — CYANOCOBALAMIN 1000 UG/ML
1000 INJECTION INTRAMUSCULAR; SUBCUTANEOUS
Qty: 0 | Refills: 0 | Status: COMPLETED | OUTPATIENT
Start: 2023-11-13

## 2024-02-27 RX ORDER — CYANOCOBALAMIN 1000 UG/ML
1000 INJECTION INTRAMUSCULAR; SUBCUTANEOUS
Qty: 0 | Refills: 0 | Status: COMPLETED | OUTPATIENT
Start: 2024-02-27

## 2024-03-29 ENCOUNTER — APPOINTMENT (OUTPATIENT)
Dept: INTERNAL MEDICINE | Facility: CLINIC | Age: 64
End: 2024-03-29
Payer: COMMERCIAL

## 2024-03-29 VITALS
WEIGHT: 210 LBS | TEMPERATURE: 98 F | BODY MASS INDEX: 31.83 KG/M2 | HEART RATE: 87 BPM | RESPIRATION RATE: 16 BRPM | HEIGHT: 68 IN | OXYGEN SATURATION: 97 %

## 2024-03-29 DIAGNOSIS — E61.1 IRON DEFICIENCY: ICD-10-CM

## 2024-03-29 PROCEDURE — 36415 COLL VENOUS BLD VENIPUNCTURE: CPT

## 2024-03-29 PROCEDURE — 99214 OFFICE O/P EST MOD 30 MIN: CPT | Mod: 25

## 2024-03-29 PROCEDURE — 96372 THER/PROPH/DIAG INJ SC/IM: CPT

## 2024-03-29 RX ORDER — CYANOCOBALAMIN 1000 UG/ML
1000 INJECTION INTRAMUSCULAR; SUBCUTANEOUS
Qty: 0 | Refills: 0 | Status: COMPLETED | OUTPATIENT
Start: 2024-03-29

## 2024-03-29 RX ADMIN — CYANOCOBALAMIN 0 MCG/ML: 1000 INJECTION INTRAMUSCULAR; SUBCUTANEOUS at 00:00

## 2024-03-29 NOTE — ASSESSMENT
[FreeTextEntry1] : Antoinette is a 62 yo F w PMHx RA, ovarian cysts s/p oophorectomy, intraductal papilloma of right breast s/p excisional biopsy   Anxiety is now very well controlled.    hcm 1/18/2023- DEXA- normal bone density  Gyn (Dr. Salomon) 2/2023 - transvaginal US (normal) - has a small remanent of one of her ovaries (they removed both ovaries) which is checked yearly  Sono/Mammo- 4/2023- normal - pending appt, she is high risk for breast cancer (has mammo q6 mo and  mri yearly) and will be seeing breast surgeon Dr. Juarez  Patient has blood work through rheum- Exercises three times a week (gym- 20 min treadmill, weight training)    Anxiety  Well controlled   Frequency  Fu UA w culture   B12 deficiency  fu b12 levels   rto 3 mo

## 2024-03-29 NOTE — HISTORY OF PRESENT ILLNESS
[FreeTextEntry1] : FU b12 deficiency  [de-identified] : Antoinette is a 64 yo F w PMHx RA, ovarian cysts s/p oophorectomy, intraductal papilloma of right breast s/p excisional biopsy   Anxiety is now very well controlled.    hcm 1/18/2023- DEXA- normal bone density  Gyn (Dr. Salomon) 2/2023 - transvaginal US (normal) - has a small remanent of one of her ovaries (they removed both ovaries) which is checked yearly  Sono/Mammo- 4/2023- normal - pending appt, she is high risk for breast cancer (has mammo q6 mo and  mri yearly) and will be seeing breast surgeon Dr. Juarez  Patient has blood work through rheum- Exercises three times a week (gym- 20 min treadmill, weight training)

## 2024-03-31 LAB
ALBUMIN SERPL ELPH-MCNC: 4.8 G/DL
ALP BLD-CCNC: 90 U/L
ALT SERPL-CCNC: 26 U/L
ANION GAP SERPL CALC-SCNC: 13 MMOL/L
AST SERPL-CCNC: 26 U/L
BASOPHILS # BLD AUTO: 0.03 K/UL
BASOPHILS NFR BLD AUTO: 0.8 %
BILIRUB SERPL-MCNC: 1 MG/DL
BUN SERPL-MCNC: 17 MG/DL
CALCIUM SERPL-MCNC: 10.7 MG/DL
CHLORIDE SERPL-SCNC: 105 MMOL/L
CO2 SERPL-SCNC: 23 MMOL/L
CREAT SERPL-MCNC: 0.87 MG/DL
EGFR: 74 ML/MIN/1.73M2
EOSINOPHIL # BLD AUTO: 0.22 K/UL
EOSINOPHIL NFR BLD AUTO: 6.1 %
FOLATE SERPL-MCNC: 18.4 NG/ML
GLUCOSE SERPL-MCNC: 100 MG/DL
HCT VFR BLD CALC: 43.4 %
HGB BLD-MCNC: 14.3 G/DL
IMM GRANULOCYTES NFR BLD AUTO: 0 %
IRON SATN MFR SERPL: 58 %
IRON SERPL-MCNC: 153 UG/DL
LYMPHOCYTES # BLD AUTO: 1.98 K/UL
LYMPHOCYTES NFR BLD AUTO: 54.5 %
MAN DIFF?: NORMAL
MCHC RBC-ENTMCNC: 31.5 PG
MCHC RBC-ENTMCNC: 32.9 GM/DL
MCV RBC AUTO: 95.6 FL
MONOCYTES # BLD AUTO: 0.28 K/UL
MONOCYTES NFR BLD AUTO: 7.7 %
NEUTROPHILS # BLD AUTO: 1.12 K/UL
NEUTROPHILS NFR BLD AUTO: 30.9 %
PLATELET # BLD AUTO: 206 K/UL
POTASSIUM SERPL-SCNC: 4.4 MMOL/L
PROT SERPL-MCNC: 7.2 G/DL
RBC # BLD: 4.54 M/UL
RBC # FLD: 13.2 %
SODIUM SERPL-SCNC: 141 MMOL/L
TIBC SERPL-MCNC: 263 UG/DL
UIBC SERPL-MCNC: 111 UG/DL
VIT B12 SERPL-MCNC: 162 PG/ML
WBC # FLD AUTO: 3.63 K/UL

## 2024-04-05 ENCOUNTER — APPOINTMENT (OUTPATIENT)
Dept: INTERNAL MEDICINE | Facility: CLINIC | Age: 64
End: 2024-04-05
Payer: COMMERCIAL

## 2024-04-05 PROCEDURE — 96372 THER/PROPH/DIAG INJ SC/IM: CPT

## 2024-04-05 RX ORDER — CYANOCOBALAMIN 1000 UG/ML
1000 INJECTION INTRAMUSCULAR; SUBCUTANEOUS
Qty: 0 | Refills: 0 | Status: COMPLETED | OUTPATIENT
Start: 2024-04-05

## 2024-04-05 RX ADMIN — CYANOCOBALAMIN 0 MCG/ML: 1000 INJECTION, SOLUTION INTRAMUSCULAR at 00:00

## 2024-04-05 NOTE — PROCEDURE
[FreeTextEntry1] : inj  [FreeTextEntry2] : b12 def  [FreeTextEntry3] : clean area w alcohol and administered R deltoid, IM, patient tolerated well

## 2024-04-18 ENCOUNTER — APPOINTMENT (OUTPATIENT)
Dept: INTERNAL MEDICINE | Facility: CLINIC | Age: 64
End: 2024-04-18
Payer: COMMERCIAL

## 2024-04-18 PROCEDURE — 96372 THER/PROPH/DIAG INJ SC/IM: CPT

## 2024-04-18 RX ORDER — CYANOCOBALAMIN 1000 UG/ML
1000 INJECTION INTRAMUSCULAR; SUBCUTANEOUS
Qty: 0 | Refills: 0 | Status: COMPLETED | OUTPATIENT
Start: 2024-04-18

## 2024-04-18 RX ADMIN — CYANOCOBALAMIN 1 MCG/ML: 1000 INJECTION INTRAMUSCULAR; SUBCUTANEOUS at 00:00

## 2024-04-29 ENCOUNTER — APPOINTMENT (OUTPATIENT)
Dept: INTERNAL MEDICINE | Facility: CLINIC | Age: 64
End: 2024-04-29
Payer: COMMERCIAL

## 2024-04-29 PROCEDURE — 96372 THER/PROPH/DIAG INJ SC/IM: CPT

## 2024-04-29 RX ORDER — CYANOCOBALAMIN 1000 UG/ML
1000 INJECTION INTRAMUSCULAR; SUBCUTANEOUS
Qty: 0 | Refills: 0 | Status: COMPLETED | OUTPATIENT
Start: 2024-04-29

## 2024-04-29 RX ADMIN — CYANOCOBALAMIN 1 MCG/ML: 1000 INJECTION INTRAMUSCULAR; SUBCUTANEOUS at 00:00

## 2024-05-06 ENCOUNTER — APPOINTMENT (OUTPATIENT)
Dept: INTERNAL MEDICINE | Facility: CLINIC | Age: 64
End: 2024-05-06
Payer: COMMERCIAL

## 2024-05-06 DIAGNOSIS — D64.9 ANEMIA, UNSPECIFIED: ICD-10-CM

## 2024-05-06 DIAGNOSIS — M06.9 RHEUMATOID ARTHRITIS, UNSPECIFIED: ICD-10-CM

## 2024-05-06 DIAGNOSIS — F41.9 ANXIETY DISORDER, UNSPECIFIED: ICD-10-CM

## 2024-05-06 LAB
ALBUMIN SERPL ELPH-MCNC: 5 G/DL
ALP BLD-CCNC: 90 U/L
ALT SERPL-CCNC: 23 U/L
ANION GAP SERPL CALC-SCNC: 9 MMOL/L
AST SERPL-CCNC: 26 U/L
BASOPHILS # BLD AUTO: 0.04 K/UL
BASOPHILS NFR BLD AUTO: 1.1 %
BILIRUB SERPL-MCNC: 0.5 MG/DL
BUN SERPL-MCNC: 16 MG/DL
CALCIUM SERPL-MCNC: 10.3 MG/DL
CHLORIDE SERPL-SCNC: 108 MMOL/L
CO2 SERPL-SCNC: 27 MMOL/L
CREAT SERPL-MCNC: 0.86 MG/DL
EGFR: 75 ML/MIN/1.73M2
EOSINOPHIL # BLD AUTO: 0.25 K/UL
EOSINOPHIL NFR BLD AUTO: 7.1 %
FOLATE SERPL-MCNC: 13 NG/ML
GLUCOSE SERPL-MCNC: 96 MG/DL
HCT VFR BLD CALC: 43.3 %
HGB BLD-MCNC: 14.4 G/DL
IMM GRANULOCYTES NFR BLD AUTO: 0.3 %
LYMPHOCYTES # BLD AUTO: 1.86 K/UL
LYMPHOCYTES NFR BLD AUTO: 53 %
MAN DIFF?: NORMAL
MCHC RBC-ENTMCNC: 31.4 PG
MCHC RBC-ENTMCNC: 33.3 GM/DL
MCV RBC AUTO: 94.5 FL
MONOCYTES # BLD AUTO: 0.19 K/UL
MONOCYTES NFR BLD AUTO: 5.4 %
NEUTROPHILS # BLD AUTO: 1.16 K/UL
NEUTROPHILS NFR BLD AUTO: 33.1 %
PLATELET # BLD AUTO: 167 K/UL
POTASSIUM SERPL-SCNC: 4.3 MMOL/L
PROT SERPL-MCNC: 7.1 G/DL
RBC # BLD: 4.58 M/UL
RBC # FLD: 13.7 %
SODIUM SERPL-SCNC: 143 MMOL/L
VIT B12 SERPL-MCNC: 300 PG/ML
WBC # FLD AUTO: 3.51 K/UL

## 2024-05-06 PROCEDURE — 99214 OFFICE O/P EST MOD 30 MIN: CPT

## 2024-05-06 PROCEDURE — 36415 COLL VENOUS BLD VENIPUNCTURE: CPT

## 2024-05-06 NOTE — ASSESSMENT
[FreeTextEntry1] : Antoinette is a 64 yo F w PMHx RA, ovarian cysts s/p oophorectomy, intraductal papilloma of right breast s/p excisional biopsy, Vitamin D deficiency   RA  Managed by rheum on hydroxychloroquine   Anxiety  Stable on conservative treatment   B12 def anemia  fu cbc /b12  b12 injection administered today and tolerated well - 4th weekly dose  next dose depending on blood results   rto 3 mo

## 2024-05-06 NOTE — HISTORY OF PRESENT ILLNESS
[FreeTextEntry1] : fu b12  [de-identified] : Antoinette is a 62 yo F w PMHx RA, ovarian cysts s/p oophorectomy, intraductal papilloma of right breast s/p excisional biopsy, Vitamin D deficiency  Reports increased energy levels  Anxiety is now very well controlled.    hcm 1/18/2023- DEXA- normal bone density  Gyn (Dr. Salomon) 2/2023 - transvaginal US (normal) - has a small remanent of one of her ovaries (they removed both ovaries) which is checked yearly  Sono/Mammo- 4/2023- normal - pending appt, she is high risk for breast cancer (has mammo q6 mo and  mri yearly) and will be seeing breast surgeon Dr. Juarez  Patient has blood work through rheum- Exercises three times a week (gym- 20 min treadmill, weight training)

## 2024-05-13 ENCOUNTER — APPOINTMENT (OUTPATIENT)
Dept: INTERNAL MEDICINE | Facility: CLINIC | Age: 64
End: 2024-05-13
Payer: COMMERCIAL

## 2024-05-13 PROCEDURE — ZZZZZ: CPT

## 2024-05-13 RX ADMIN — CYANOCOBALAMIN 0 MCG/ML: 1000 INJECTION, SOLUTION INTRAMUSCULAR at 00:00

## 2024-05-20 ENCOUNTER — APPOINTMENT (OUTPATIENT)
Dept: INTERNAL MEDICINE | Facility: CLINIC | Age: 64
End: 2024-05-20
Payer: COMMERCIAL

## 2024-05-20 DIAGNOSIS — E53.8 DEFICIENCY OF OTHER SPECIFIED B GROUP VITAMINS: ICD-10-CM

## 2024-05-20 PROCEDURE — 96372 THER/PROPH/DIAG INJ SC/IM: CPT

## 2024-05-20 RX ADMIN — CYANOCOBALAMIN 0 MCG/ML: 1000 INJECTION, SOLUTION INTRAMUSCULAR at 00:00

## 2024-05-31 ENCOUNTER — APPOINTMENT (OUTPATIENT)
Dept: INTERNAL MEDICINE | Facility: CLINIC | Age: 64
End: 2024-05-31
Payer: COMMERCIAL

## 2024-05-31 ENCOUNTER — APPOINTMENT (OUTPATIENT)
Dept: INTERNAL MEDICINE | Facility: CLINIC | Age: 64
End: 2024-05-31

## 2024-05-31 PROCEDURE — 96372 THER/PROPH/DIAG INJ SC/IM: CPT

## 2024-05-31 RX ORDER — CYANOCOBALAMIN 1000 UG/ML
1000 INJECTION INTRAMUSCULAR; SUBCUTANEOUS
Qty: 0 | Refills: 0 | Status: COMPLETED | OUTPATIENT
Start: 2024-05-20

## 2024-06-01 LAB
FOLATE SERPL-MCNC: 14.5 NG/ML
VIT B12 SERPL-MCNC: 338 PG/ML

## 2024-06-14 ENCOUNTER — APPOINTMENT (OUTPATIENT)
Dept: INTERNAL MEDICINE | Facility: CLINIC | Age: 64
End: 2024-06-14
Payer: COMMERCIAL

## 2024-06-14 PROCEDURE — 96372 THER/PROPH/DIAG INJ SC/IM: CPT

## 2024-06-14 RX ORDER — CYANOCOBALAMIN 1000 UG/ML
1000 INJECTION INTRAMUSCULAR; SUBCUTANEOUS
Qty: 0 | Refills: 0 | Status: COMPLETED | OUTPATIENT
Start: 2024-05-13

## 2024-06-28 ENCOUNTER — APPOINTMENT (OUTPATIENT)
Dept: INTERNAL MEDICINE | Facility: CLINIC | Age: 64
End: 2024-06-28
Payer: COMMERCIAL

## 2024-06-28 ENCOUNTER — MED ADMIN CHARGE (OUTPATIENT)
Age: 64
End: 2024-06-28

## 2024-06-28 PROCEDURE — 96372 THER/PROPH/DIAG INJ SC/IM: CPT

## 2024-07-12 ENCOUNTER — APPOINTMENT (OUTPATIENT)
Dept: INTERNAL MEDICINE | Facility: CLINIC | Age: 64
End: 2024-07-12
Payer: COMMERCIAL

## 2024-07-12 PROCEDURE — 96372 THER/PROPH/DIAG INJ SC/IM: CPT

## 2024-07-12 RX ORDER — CYANOCOBALAMIN 1000 UG/ML
1000 INJECTION INTRAMUSCULAR; SUBCUTANEOUS
Qty: 0 | Refills: 0 | Status: COMPLETED | OUTPATIENT
Start: 2024-07-12

## 2024-07-12 RX ADMIN — CYANOCOBALAMIN 0 MCG/ML: 1000 INJECTION, SOLUTION INTRAMUSCULAR at 00:00

## 2024-07-16 ENCOUNTER — APPOINTMENT (OUTPATIENT)
Dept: INTERNAL MEDICINE | Facility: CLINIC | Age: 64
End: 2024-07-16
Payer: COMMERCIAL

## 2024-07-16 VITALS
HEART RATE: 83 BPM | TEMPERATURE: 97.6 F | DIASTOLIC BLOOD PRESSURE: 92 MMHG | OXYGEN SATURATION: 95 % | RESPIRATION RATE: 16 BRPM | WEIGHT: 212 LBS | BODY MASS INDEX: 32.13 KG/M2 | SYSTOLIC BLOOD PRESSURE: 149 MMHG | HEIGHT: 68 IN

## 2024-07-16 DIAGNOSIS — M06.9 RHEUMATOID ARTHRITIS, UNSPECIFIED: ICD-10-CM

## 2024-07-16 DIAGNOSIS — E53.8 DEFICIENCY OF OTHER SPECIFIED B GROUP VITAMINS: ICD-10-CM

## 2024-07-16 DIAGNOSIS — F41.9 ANXIETY DISORDER, UNSPECIFIED: ICD-10-CM

## 2024-07-16 DIAGNOSIS — D64.9 ANEMIA, UNSPECIFIED: ICD-10-CM

## 2024-07-16 DIAGNOSIS — E61.1 IRON DEFICIENCY: ICD-10-CM

## 2024-07-16 PROCEDURE — 99214 OFFICE O/P EST MOD 30 MIN: CPT

## 2024-07-16 PROCEDURE — G2211 COMPLEX E/M VISIT ADD ON: CPT | Mod: NC,1L

## 2024-07-17 LAB
FOLATE SERPL-MCNC: 16.5 NG/ML
VIT B12 SERPL-MCNC: 429 PG/ML

## 2024-07-18 LAB
ALBUMIN SERPL ELPH-MCNC: 5.1 G/DL
ALP BLD-CCNC: 87 U/L
ALT SERPL-CCNC: 21 U/L
ANION GAP SERPL CALC-SCNC: 17 MMOL/L
AST SERPL-CCNC: 22 U/L
BILIRUB SERPL-MCNC: 0.8 MG/DL
BUN SERPL-MCNC: 15 MG/DL
CALCIUM SERPL-MCNC: 10.9 MG/DL
CHLORIDE SERPL-SCNC: 105 MMOL/L
CHOLEST SERPL-MCNC: 182 MG/DL
CO2 SERPL-SCNC: 20 MMOL/L
CREAT SERPL-MCNC: 0.83 MG/DL
EGFR: 78 ML/MIN/1.73M2
FERRITIN SERPL-MCNC: 249 NG/ML
GLUCOSE SERPL-MCNC: 91 MG/DL
HDLC SERPL-MCNC: 75 MG/DL
IRON SATN MFR SERPL: 42 %
IRON SERPL-MCNC: 132 UG/DL
LDLC SERPL CALC-MCNC: 94 MG/DL
NONHDLC SERPL-MCNC: 107 MG/DL
POTASSIUM SERPL-SCNC: 4.3 MMOL/L
PROT SERPL-MCNC: 7.5 G/DL
SODIUM SERPL-SCNC: 143 MMOL/L
TIBC SERPL-MCNC: 313 UG/DL
TRIGL SERPL-MCNC: 70 MG/DL
TSH SERPL-ACNC: 1.6 UIU/ML
UIBC SERPL-MCNC: 181 UG/DL
URATE SERPL-MCNC: 5 MG/DL

## 2024-07-19 ENCOUNTER — APPOINTMENT (OUTPATIENT)
Dept: INTERNAL MEDICINE | Facility: CLINIC | Age: 64
End: 2024-07-19

## 2024-07-26 ENCOUNTER — LABORATORY RESULT (OUTPATIENT)
Age: 64
End: 2024-07-26

## 2024-07-26 ENCOUNTER — MED ADMIN CHARGE (OUTPATIENT)
Age: 64
End: 2024-07-26

## 2024-07-26 ENCOUNTER — APPOINTMENT (OUTPATIENT)
Dept: INTERNAL MEDICINE | Facility: CLINIC | Age: 64
End: 2024-07-26
Payer: COMMERCIAL

## 2024-07-26 RX ORDER — CYANOCOBALAMIN 1000 UG/ML
1000 INJECTION INTRAMUSCULAR; SUBCUTANEOUS
Qty: 0 | Refills: 0 | Status: COMPLETED | OUTPATIENT
Start: 2024-07-26

## 2024-07-26 RX ADMIN — CYANOCOBALAMIN 0 MCG/ML: 1000 INJECTION, SOLUTION INTRAMUSCULAR at 00:00

## 2024-07-29 LAB
APPEARANCE: CLEAR
BASOPHILS # BLD AUTO: 0.05 K/UL
BASOPHILS NFR BLD AUTO: 1.2 %
BILIRUBIN URINE: NEGATIVE
BLOOD URINE: NEGATIVE
CALCIUM SERPL-MCNC: 10.6 MG/DL
COLOR: YELLOW
EOSINOPHIL # BLD AUTO: 0.24 K/UL
EOSINOPHIL NFR BLD AUTO: 5.8 %
ESTIMATED AVERAGE GLUCOSE: 103 MG/DL
GLUCOSE QUALITATIVE U: NEGATIVE MG/DL
HBA1C MFR BLD HPLC: 5.2 %
HCT VFR BLD CALC: 45 %
HGB BLD-MCNC: 14 G/DL
IMM GRANULOCYTES NFR BLD AUTO: 0.2 %
KETONES URINE: NEGATIVE MG/DL
LEUKOCYTE ESTERASE URINE: ABNORMAL
LYMPHOCYTES # BLD AUTO: 1.62 K/UL
LYMPHOCYTES NFR BLD AUTO: 39.1 %
MAN DIFF?: NORMAL
MCHC RBC-ENTMCNC: 31 PG
MCHC RBC-ENTMCNC: 31.1 GM/DL
MCV RBC AUTO: 99.6 FL
MONOCYTES # BLD AUTO: 0.26 K/UL
MONOCYTES NFR BLD AUTO: 6.3 %
NEUTROPHILS # BLD AUTO: 1.96 K/UL
NEUTROPHILS NFR BLD AUTO: 47.4 %
NITRITE URINE: NEGATIVE
PH URINE: 5.5
PLATELET # BLD AUTO: 185 K/UL
PROTEIN URINE: NEGATIVE MG/DL
RBC # BLD: 4.52 M/UL
RBC # FLD: 13.8 %
SPECIFIC GRAVITY URINE: 1.01
UROBILINOGEN URINE: 0.2 MG/DL
WBC # FLD AUTO: 4.14 K/UL

## 2024-08-09 ENCOUNTER — APPOINTMENT (OUTPATIENT)
Dept: INTERNAL MEDICINE | Facility: CLINIC | Age: 64
End: 2024-08-09

## 2024-08-09 PROCEDURE — 96372 THER/PROPH/DIAG INJ SC/IM: CPT

## 2024-08-23 ENCOUNTER — APPOINTMENT (OUTPATIENT)
Dept: INTERNAL MEDICINE | Facility: CLINIC | Age: 64
End: 2024-08-23
Payer: COMMERCIAL

## 2024-08-23 PROCEDURE — 96372 THER/PROPH/DIAG INJ SC/IM: CPT

## 2024-08-23 RX ORDER — CYANOCOBALAMIN 1000 UG/ML
1000 INJECTION INTRAMUSCULAR; SUBCUTANEOUS
Qty: 0 | Refills: 0 | Status: COMPLETED | OUTPATIENT
Start: 2024-08-23

## 2024-08-23 RX ADMIN — CYANOCOBALAMIN 0 MCG/ML: 1000 INJECTION, SOLUTION INTRAMUSCULAR at 00:00

## 2024-09-04 ENCOUNTER — APPOINTMENT (OUTPATIENT)
Dept: INTERNAL MEDICINE | Facility: CLINIC | Age: 64
End: 2024-09-04
Payer: COMMERCIAL

## 2024-09-04 PROCEDURE — 96372 THER/PROPH/DIAG INJ SC/IM: CPT

## 2024-09-04 RX ORDER — CYANOCOBALAMIN 1000 UG/ML
1000 INJECTION INTRAMUSCULAR; SUBCUTANEOUS
Qty: 0 | Refills: 0 | Status: COMPLETED | OUTPATIENT
Start: 2024-09-04

## 2024-09-04 RX ADMIN — CYANOCOBALAMIN 0 MCG/ML: 1000 INJECTION, SOLUTION INTRAMUSCULAR at 00:00

## 2024-09-17 DIAGNOSIS — E53.8 DEFICIENCY OF OTHER SPECIFIED B GROUP VITAMINS: ICD-10-CM

## 2024-09-18 ENCOUNTER — LABORATORY RESULT (OUTPATIENT)
Age: 64
End: 2024-09-18

## 2024-09-18 ENCOUNTER — APPOINTMENT (OUTPATIENT)
Dept: INTERNAL MEDICINE | Facility: CLINIC | Age: 64
End: 2024-09-18
Payer: COMMERCIAL

## 2024-09-18 PROCEDURE — 96372 THER/PROPH/DIAG INJ SC/IM: CPT

## 2024-09-18 RX ORDER — CYANOCOBALAMIN 1000 UG/ML
1000 INJECTION INTRAMUSCULAR; SUBCUTANEOUS
Qty: 0 | Refills: 0 | Status: COMPLETED | OUTPATIENT
Start: 2024-09-17

## 2024-09-19 LAB
APPEARANCE: CLEAR
BILIRUBIN URINE: NEGATIVE
BLOOD URINE: NEGATIVE
COLOR: YELLOW
GLUCOSE QUALITATIVE U: NEGATIVE MG/DL
KETONES URINE: NEGATIVE MG/DL
LEUKOCYTE ESTERASE URINE: ABNORMAL
NITRITE URINE: NEGATIVE
PH URINE: 7
PROTEIN URINE: NEGATIVE MG/DL
SPECIFIC GRAVITY URINE: 1.01
UROBILINOGEN URINE: 0.2 MG/DL

## 2024-09-19 RX ORDER — NITROFURANTOIN MACROCRYSTALS 100 MG/1
100 CAPSULE ORAL
Qty: 10 | Refills: 0 | Status: ACTIVE | COMMUNITY
Start: 2024-09-19 | End: 1900-01-01

## 2024-10-07 ENCOUNTER — LABORATORY RESULT (OUTPATIENT)
Age: 64
End: 2024-10-07

## 2024-10-07 ENCOUNTER — APPOINTMENT (OUTPATIENT)
Dept: INTERNAL MEDICINE | Facility: CLINIC | Age: 64
End: 2024-10-07

## 2024-10-07 PROCEDURE — 96372 THER/PROPH/DIAG INJ SC/IM: CPT

## 2024-10-07 RX ORDER — CYANOCOBALAMIN 1000 UG/ML
1000 INJECTION INTRAMUSCULAR; SUBCUTANEOUS
Qty: 0 | Refills: 0 | Status: COMPLETED | OUTPATIENT
Start: 2024-10-07

## 2024-10-08 LAB
APPEARANCE: CLEAR
BILIRUBIN URINE: NEGATIVE
BLOOD URINE: NEGATIVE
COLOR: YELLOW
GLUCOSE QUALITATIVE U: NEGATIVE MG/DL
KETONES URINE: NEGATIVE MG/DL
LEUKOCYTE ESTERASE URINE: ABNORMAL
NITRITE URINE: NEGATIVE
PH URINE: 6
PROTEIN URINE: NEGATIVE MG/DL
SPECIFIC GRAVITY URINE: 1.01
UROBILINOGEN URINE: 0.2 MG/DL

## 2024-10-16 ENCOUNTER — NON-APPOINTMENT (OUTPATIENT)
Age: 64
End: 2024-10-16

## 2024-10-16 ENCOUNTER — LABORATORY RESULT (OUTPATIENT)
Age: 64
End: 2024-10-16

## 2024-10-16 ENCOUNTER — APPOINTMENT (OUTPATIENT)
Dept: INTERNAL MEDICINE | Facility: CLINIC | Age: 64
End: 2024-10-16
Payer: COMMERCIAL

## 2024-10-16 VITALS
OXYGEN SATURATION: 97 % | HEART RATE: 81 BPM | BODY MASS INDEX: 31.83 KG/M2 | TEMPERATURE: 97.6 F | WEIGHT: 210 LBS | RESPIRATION RATE: 16 BRPM | HEIGHT: 68 IN

## 2024-10-16 DIAGNOSIS — Z00.00 ENCOUNTER FOR GENERAL ADULT MEDICAL EXAMINATION W/OUT ABNORMAL FINDINGS: ICD-10-CM

## 2024-10-16 DIAGNOSIS — E61.1 IRON DEFICIENCY: ICD-10-CM

## 2024-10-16 PROCEDURE — 96372 THER/PROPH/DIAG INJ SC/IM: CPT

## 2024-10-16 PROCEDURE — 90656 IIV3 VACC NO PRSV 0.5 ML IM: CPT

## 2024-10-16 PROCEDURE — 99397 PER PM REEVAL EST PAT 65+ YR: CPT | Mod: 25

## 2024-10-16 PROCEDURE — G0008: CPT

## 2024-10-16 PROCEDURE — 93000 ELECTROCARDIOGRAM COMPLETE: CPT | Mod: 59

## 2024-10-16 PROCEDURE — G0444 DEPRESSION SCREEN ANNUAL: CPT | Mod: 59

## 2024-10-16 RX ORDER — CYANOCOBALAMIN 1000 UG/ML
1000 INJECTION INTRAMUSCULAR; SUBCUTANEOUS
Qty: 0 | Refills: 0 | Status: COMPLETED | OUTPATIENT
Start: 2024-10-16

## 2024-10-16 RX ORDER — SULFAMETHOXAZOLE AND TRIMETHOPRIM 800; 160 MG/1; MG/1
800-160 TABLET ORAL TWICE DAILY
Qty: 10 | Refills: 0 | Status: DISCONTINUED | COMMUNITY
Start: 2024-10-07 | End: 2024-10-16

## 2024-10-16 RX ADMIN — CYANOCOBALAMIN 1 MCG/ML: 1000 INJECTION, SOLUTION INTRAMUSCULAR at 00:00

## 2024-10-17 LAB
25(OH)D3 SERPL-MCNC: 20.4 NG/ML
ALBUMIN SERPL ELPH-MCNC: 4.8 G/DL
ALP BLD-CCNC: 85 U/L
ALT SERPL-CCNC: 23 U/L
ANION GAP SERPL CALC-SCNC: 13 MMOL/L
APPEARANCE: CLEAR
AST SERPL-CCNC: 21 U/L
BASOPHILS # BLD AUTO: 0.04 K/UL
BASOPHILS NFR BLD AUTO: 1.1 %
BILIRUB SERPL-MCNC: 0.8 MG/DL
BILIRUBIN URINE: NEGATIVE
BLOOD URINE: NEGATIVE
BUN SERPL-MCNC: 18 MG/DL
CALCIUM SERPL-MCNC: 10.2 MG/DL
CHLORIDE SERPL-SCNC: 106 MMOL/L
CHOLEST SERPL-MCNC: 170 MG/DL
CO2 SERPL-SCNC: 22 MMOL/L
COLOR: NORMAL
CREAT SERPL-MCNC: 0.79 MG/DL
EGFR: 82 ML/MIN/1.73M2
EOSINOPHIL # BLD AUTO: 0.22 K/UL
EOSINOPHIL NFR BLD AUTO: 6 %
ESTIMATED AVERAGE GLUCOSE: 103 MG/DL
FERRITIN SERPL-MCNC: 317 NG/ML
GLUCOSE QUALITATIVE U: NEGATIVE MG/DL
GLUCOSE SERPL-MCNC: 108 MG/DL
HBA1C MFR BLD HPLC: 5.2 %
HCT VFR BLD CALC: 41.6 %
HDLC SERPL-MCNC: 67 MG/DL
HGB BLD-MCNC: 13.7 G/DL
IMM GRANULOCYTES NFR BLD AUTO: 0.3 %
IRON SATN MFR SERPL: 47 %
IRON SERPL-MCNC: 130 UG/DL
KETONES URINE: NEGATIVE MG/DL
LDLC SERPL CALC-MCNC: 87 MG/DL
LEUKOCYTE ESTERASE URINE: ABNORMAL
LYMPHOCYTES # BLD AUTO: 1.72 K/UL
LYMPHOCYTES NFR BLD AUTO: 46.9 %
MAN DIFF?: NORMAL
MCHC RBC-ENTMCNC: 31 PG
MCHC RBC-ENTMCNC: 32.9 GM/DL
MCV RBC AUTO: 94.1 FL
MONOCYTES # BLD AUTO: 0.22 K/UL
MONOCYTES NFR BLD AUTO: 6 %
NEUTROPHILS # BLD AUTO: 1.46 K/UL
NEUTROPHILS NFR BLD AUTO: 39.7 %
NITRITE URINE: NEGATIVE
NONHDLC SERPL-MCNC: 103 MG/DL
PH URINE: 5.5
PLATELET # BLD AUTO: 188 K/UL
POTASSIUM SERPL-SCNC: 4.4 MMOL/L
PROT SERPL-MCNC: 7 G/DL
PROTEIN URINE: NORMAL MG/DL
RBC # BLD: 4.42 M/UL
RBC # FLD: 13.3 %
SODIUM SERPL-SCNC: 141 MMOL/L
SPECIFIC GRAVITY URINE: 1.03
TIBC SERPL-MCNC: 278 UG/DL
TRIGL SERPL-MCNC: 91 MG/DL
TSH SERPL-ACNC: 1.79 UIU/ML
UIBC SERPL-MCNC: 148 UG/DL
URATE SERPL-MCNC: 4.9 MG/DL
UROBILINOGEN URINE: 0.2 MG/DL
VIT B12 SERPL-MCNC: 1502 PG/ML
WBC # FLD AUTO: 3.67 K/UL

## 2024-10-21 ENCOUNTER — APPOINTMENT (OUTPATIENT)
Dept: INTERNAL MEDICINE | Facility: CLINIC | Age: 64
End: 2024-10-21
Payer: COMMERCIAL

## 2024-10-21 ENCOUNTER — APPOINTMENT (OUTPATIENT)
Dept: INTERNAL MEDICINE | Facility: CLINIC | Age: 64
End: 2024-10-21

## 2024-10-21 VITALS
HEART RATE: 74 BPM | SYSTOLIC BLOOD PRESSURE: 139 MMHG | OXYGEN SATURATION: 95 % | HEIGHT: 68 IN | DIASTOLIC BLOOD PRESSURE: 91 MMHG | TEMPERATURE: 97.3 F | RESPIRATION RATE: 16 BRPM | WEIGHT: 210 LBS | BODY MASS INDEX: 31.83 KG/M2

## 2024-10-21 DIAGNOSIS — D72.819 DECREASED WHITE BLOOD CELL COUNT, UNSPECIFIED: ICD-10-CM

## 2024-10-21 DIAGNOSIS — E55.9 VITAMIN D DEFICIENCY, UNSPECIFIED: ICD-10-CM

## 2024-10-21 DIAGNOSIS — E53.8 DEFICIENCY OF OTHER SPECIFIED B GROUP VITAMINS: ICD-10-CM

## 2024-10-21 PROCEDURE — 99214 OFFICE O/P EST MOD 30 MIN: CPT

## 2024-10-21 PROCEDURE — G2211 COMPLEX E/M VISIT ADD ON: CPT | Mod: NC

## 2024-10-23 ENCOUNTER — NON-APPOINTMENT (OUTPATIENT)
Age: 64
End: 2024-10-23

## 2024-10-23 ENCOUNTER — APPOINTMENT (OUTPATIENT)
Dept: CARDIOLOGY | Facility: CLINIC | Age: 64
End: 2024-10-23
Payer: COMMERCIAL

## 2024-10-23 VITALS
DIASTOLIC BLOOD PRESSURE: 87 MMHG | HEART RATE: 77 BPM | WEIGHT: 210 LBS | BODY MASS INDEX: 31.83 KG/M2 | OXYGEN SATURATION: 96 % | HEIGHT: 68 IN | SYSTOLIC BLOOD PRESSURE: 134 MMHG

## 2024-10-23 VITALS — SYSTOLIC BLOOD PRESSURE: 140 MMHG | DIASTOLIC BLOOD PRESSURE: 94 MMHG

## 2024-10-23 DIAGNOSIS — R94.31 ABNORMAL ELECTROCARDIOGRAM [ECG] [EKG]: ICD-10-CM

## 2024-10-23 DIAGNOSIS — M06.9 RHEUMATOID ARTHRITIS, UNSPECIFIED: ICD-10-CM

## 2024-10-23 PROCEDURE — 99204 OFFICE O/P NEW MOD 45 MIN: CPT | Mod: 25

## 2024-10-23 PROCEDURE — 93000 ELECTROCARDIOGRAM COMPLETE: CPT

## 2024-10-29 ENCOUNTER — APPOINTMENT (OUTPATIENT)
Dept: INTERNAL MEDICINE | Facility: CLINIC | Age: 64
End: 2024-10-29

## 2024-11-06 ENCOUNTER — APPOINTMENT (OUTPATIENT)
Dept: CARDIOLOGY | Facility: CLINIC | Age: 64
End: 2024-11-06
Payer: COMMERCIAL

## 2024-11-06 PROCEDURE — 93015 CV STRESS TEST SUPVJ I&R: CPT

## 2024-11-26 ENCOUNTER — APPOINTMENT (OUTPATIENT)
Dept: INTERNAL MEDICINE | Facility: CLINIC | Age: 64
End: 2024-11-26
Payer: COMMERCIAL

## 2024-11-26 PROCEDURE — 96372 THER/PROPH/DIAG INJ SC/IM: CPT

## 2024-11-26 RX ADMIN — CYANOCOBALAMIN 0 MCG/ML: 1000 INJECTION, SOLUTION INTRAMUSCULAR; SUBCUTANEOUS at 00:00

## 2024-12-06 ENCOUNTER — APPOINTMENT (OUTPATIENT)
Dept: INTERNAL MEDICINE | Facility: CLINIC | Age: 64
End: 2024-12-06
Payer: COMMERCIAL

## 2024-12-06 VITALS
RESPIRATION RATE: 14 BRPM | TEMPERATURE: 97.6 F | HEART RATE: 75 BPM | BODY MASS INDEX: 31.83 KG/M2 | OXYGEN SATURATION: 98 % | HEIGHT: 68 IN | DIASTOLIC BLOOD PRESSURE: 80 MMHG | WEIGHT: 210 LBS | SYSTOLIC BLOOD PRESSURE: 135 MMHG

## 2024-12-06 DIAGNOSIS — N83.201 UNSPECIFIED OVARIAN CYST, RIGHT SIDE: ICD-10-CM

## 2024-12-06 DIAGNOSIS — Z01.818 ENCOUNTER FOR OTHER PREPROCEDURAL EXAMINATION: ICD-10-CM

## 2024-12-06 DIAGNOSIS — E53.8 DEFICIENCY OF OTHER SPECIFIED B GROUP VITAMINS: ICD-10-CM

## 2024-12-06 PROCEDURE — 99214 OFFICE O/P EST MOD 30 MIN: CPT

## 2024-12-06 PROCEDURE — G2211 COMPLEX E/M VISIT ADD ON: CPT | Mod: NC

## 2024-12-24 ENCOUNTER — APPOINTMENT (OUTPATIENT)
Dept: INTERNAL MEDICINE | Facility: CLINIC | Age: 64
End: 2024-12-24
Payer: COMMERCIAL

## 2024-12-24 PROCEDURE — 96372 THER/PROPH/DIAG INJ SC/IM: CPT

## 2024-12-27 ENCOUNTER — APPOINTMENT (OUTPATIENT)
Dept: INTERNAL MEDICINE | Facility: CLINIC | Age: 64
End: 2024-12-27
Payer: COMMERCIAL

## 2024-12-27 PROCEDURE — 36415 COLL VENOUS BLD VENIPUNCTURE: CPT

## 2024-12-27 RX ORDER — SULFAMETHOXAZOLE AND TRIMETHOPRIM 800; 160 MG/1; MG/1
800-160 TABLET ORAL TWICE DAILY
Qty: 10 | Refills: 0 | Status: ACTIVE | COMMUNITY
Start: 2024-12-27 | End: 1900-01-01

## 2024-12-30 LAB
APPEARANCE: CLEAR
BACTERIA: NEGATIVE /HPF
BILIRUBIN URINE: NEGATIVE
BLOOD URINE: NEGATIVE
CAST: 0 /LPF
COLOR: YELLOW
EPITHELIAL CELLS: 2 /HPF
GLUCOSE QUALITATIVE U: NEGATIVE MG/DL
KETONES URINE: NEGATIVE MG/DL
LEUKOCYTE ESTERASE URINE: ABNORMAL
MICROSCOPIC-UA: NORMAL
NITRITE URINE: NEGATIVE
PH URINE: 6
PROTEIN URINE: NEGATIVE MG/DL
RED BLOOD CELLS URINE: 1 /HPF
SPECIFIC GRAVITY URINE: 1.01
UROBILINOGEN URINE: 0.2 MG/DL
WHITE BLOOD CELLS URINE: 66 /HPF

## 2025-01-21 ENCOUNTER — APPOINTMENT (OUTPATIENT)
Dept: INTERNAL MEDICINE | Facility: CLINIC | Age: 65
End: 2025-01-21
Payer: COMMERCIAL

## 2025-01-21 VITALS
SYSTOLIC BLOOD PRESSURE: 133 MMHG | BODY MASS INDEX: 30.62 KG/M2 | TEMPERATURE: 97.7 F | DIASTOLIC BLOOD PRESSURE: 88 MMHG | HEART RATE: 83 BPM | RESPIRATION RATE: 16 BRPM | HEIGHT: 68 IN | WEIGHT: 202 LBS | OXYGEN SATURATION: 95 %

## 2025-01-21 DIAGNOSIS — M06.9 RHEUMATOID ARTHRITIS, UNSPECIFIED: ICD-10-CM

## 2025-01-21 DIAGNOSIS — E53.8 DEFICIENCY OF OTHER SPECIFIED B GROUP VITAMINS: ICD-10-CM

## 2025-01-21 DIAGNOSIS — D64.9 ANEMIA, UNSPECIFIED: ICD-10-CM

## 2025-01-21 PROCEDURE — G2211 COMPLEX E/M VISIT ADD ON: CPT | Mod: NC

## 2025-01-21 PROCEDURE — 99214 OFFICE O/P EST MOD 30 MIN: CPT

## 2025-01-24 LAB
25(OH)D3 SERPL-MCNC: 20.8 NG/ML
ALBUMIN SERPL ELPH-MCNC: 4.8 G/DL
ALP BLD-CCNC: 115 U/L
ALT SERPL-CCNC: 33 U/L
ANION GAP SERPL CALC-SCNC: 12 MMOL/L
AST SERPL-CCNC: 29 U/L
BASOPHILS # BLD AUTO: 0.05 K/UL
BASOPHILS NFR BLD AUTO: 1.5 %
BILIRUB SERPL-MCNC: 0.9 MG/DL
BUN SERPL-MCNC: 13 MG/DL
CALCIUM SERPL-MCNC: 10.4 MG/DL
CHLORIDE SERPL-SCNC: 108 MMOL/L
CHOLEST SERPL-MCNC: 159 MG/DL
CO2 SERPL-SCNC: 24 MMOL/L
CREAT SERPL-MCNC: 0.85 MG/DL
EGFR: 76 ML/MIN/1.73M2
EOSINOPHIL # BLD AUTO: 0.27 K/UL
EOSINOPHIL NFR BLD AUTO: 8.1 %
FOLATE SERPL-MCNC: 10.7 NG/ML
GLUCOSE SERPL-MCNC: 92 MG/DL
HCT VFR BLD CALC: 44 %
HDLC SERPL-MCNC: 64 MG/DL
HGB BLD-MCNC: 14 G/DL
IMM GRANULOCYTES NFR BLD AUTO: 0 %
LDLC SERPL CALC-MCNC: 79 MG/DL
LYMPHOCYTES # BLD AUTO: 1.7 K/UL
LYMPHOCYTES NFR BLD AUTO: 50.7 %
MAN DIFF?: NORMAL
MCHC RBC-ENTMCNC: 30.2 PG
MCHC RBC-ENTMCNC: 31.8 G/DL
MCV RBC AUTO: 95 FL
MONOCYTES # BLD AUTO: 0.18 K/UL
MONOCYTES NFR BLD AUTO: 5.4 %
NEUTROPHILS # BLD AUTO: 1.15 K/UL
NEUTROPHILS NFR BLD AUTO: 34.3 %
NONHDLC SERPL-MCNC: 94 MG/DL
PLATELET # BLD AUTO: 204 K/UL
POTASSIUM SERPL-SCNC: 4.3 MMOL/L
PROT SERPL-MCNC: 6.7 G/DL
RBC # BLD: 4.63 M/UL
RBC # FLD: 12.6 %
SODIUM SERPL-SCNC: 144 MMOL/L
TRIGL SERPL-MCNC: 81 MG/DL
VIT B12 SERPL-MCNC: 376 PG/ML
WBC # FLD AUTO: 3.35 K/UL

## 2025-01-29 ENCOUNTER — APPOINTMENT (OUTPATIENT)
Dept: INTERNAL MEDICINE | Facility: CLINIC | Age: 65
End: 2025-01-29
Payer: COMMERCIAL

## 2025-01-29 DIAGNOSIS — E55.9 VITAMIN D DEFICIENCY, UNSPECIFIED: ICD-10-CM

## 2025-01-29 PROCEDURE — 96372 THER/PROPH/DIAG INJ SC/IM: CPT

## 2025-01-29 RX ORDER — CYANOCOBALAMIN 1000 UG/ML
1000 INJECTION, SOLUTION INTRAMUSCULAR; SUBCUTANEOUS
Qty: 0 | Refills: 0 | Status: COMPLETED | OUTPATIENT
Start: 2025-01-29

## 2025-01-31 ENCOUNTER — LABORATORY RESULT (OUTPATIENT)
Age: 65
End: 2025-01-31

## 2025-01-31 ENCOUNTER — APPOINTMENT (OUTPATIENT)
Dept: INTERNAL MEDICINE | Facility: CLINIC | Age: 65
End: 2025-01-31
Payer: COMMERCIAL

## 2025-01-31 DIAGNOSIS — R39.9 UNSPECIFIED SYMPTOMS AND SIGNS INVOLVING THE GENITOURINARY SYSTEM: ICD-10-CM

## 2025-01-31 PROCEDURE — ZZZZZ: CPT

## 2025-01-31 RX ORDER — NITROFURANTOIN (MONOHYDRATE/MACROCRYSTALS) 25; 75 MG/1; MG/1
100 CAPSULE ORAL TWICE DAILY
Qty: 14 | Refills: 0 | Status: ACTIVE | COMMUNITY
Start: 2025-01-31 | End: 1900-01-01

## 2025-02-01 LAB
APPEARANCE: CLEAR
BILIRUBIN URINE: NEGATIVE
BLOOD URINE: NEGATIVE
COLOR: YELLOW
GLUCOSE QUALITATIVE U: NEGATIVE MG/DL
KETONES URINE: NEGATIVE MG/DL
LEUKOCYTE ESTERASE URINE: ABNORMAL
NITRITE URINE: NEGATIVE
PH URINE: 5.5
PROTEIN URINE: NEGATIVE MG/DL
SPECIFIC GRAVITY URINE: 1.01
UROBILINOGEN URINE: 0.2 MG/DL

## 2025-02-03 LAB — BACTERIA UR CULT: NORMAL

## 2025-02-26 ENCOUNTER — APPOINTMENT (OUTPATIENT)
Dept: INTERNAL MEDICINE | Facility: CLINIC | Age: 65
End: 2025-02-26
Payer: MEDICARE

## 2025-02-26 DIAGNOSIS — E55.9 VITAMIN D DEFICIENCY, UNSPECIFIED: ICD-10-CM

## 2025-02-26 PROCEDURE — 96372 THER/PROPH/DIAG INJ SC/IM: CPT

## 2025-02-26 RX ORDER — CYANOCOBALAMIN 1000 UG/ML
1000 INJECTION, SOLUTION INTRAMUSCULAR; SUBCUTANEOUS
Qty: 0 | Refills: 0 | Status: COMPLETED | OUTPATIENT
Start: 2025-02-26

## 2025-03-26 ENCOUNTER — APPOINTMENT (OUTPATIENT)
Dept: INTERNAL MEDICINE | Facility: CLINIC | Age: 65
End: 2025-03-26
Payer: MEDICARE

## 2025-03-26 DIAGNOSIS — E55.9 VITAMIN D DEFICIENCY, UNSPECIFIED: ICD-10-CM

## 2025-03-26 PROCEDURE — 96372 THER/PROPH/DIAG INJ SC/IM: CPT

## 2025-03-26 RX ORDER — CYANOCOBALAMIN 1000 UG/ML
1000 INJECTION, SOLUTION INTRAMUSCULAR; SUBCUTANEOUS
Qty: 0 | Refills: 0 | Status: COMPLETED | OUTPATIENT
Start: 2025-03-26

## 2025-05-01 ENCOUNTER — APPOINTMENT (OUTPATIENT)
Dept: INTERNAL MEDICINE | Facility: CLINIC | Age: 65
End: 2025-05-01
Payer: MEDICARE

## 2025-05-01 PROCEDURE — 96372 THER/PROPH/DIAG INJ SC/IM: CPT

## 2025-05-01 RX ADMIN — CYANOCOBALAMIN 1 MCG/ML: 1000 INJECTION, SOLUTION INTRAMUSCULAR; SUBCUTANEOUS at 00:00

## 2025-05-02 RX ORDER — CYANOCOBALAMIN 1000 UG/ML
1000 INJECTION, SOLUTION INTRAMUSCULAR; SUBCUTANEOUS
Qty: 0 | Refills: 0 | Status: COMPLETED | OUTPATIENT
Start: 2025-05-01

## 2025-05-29 ENCOUNTER — APPOINTMENT (OUTPATIENT)
Dept: INTERNAL MEDICINE | Facility: CLINIC | Age: 65
End: 2025-05-29
Payer: MEDICARE

## 2025-05-29 DIAGNOSIS — E53.8 DEFICIENCY OF OTHER SPECIFIED B GROUP VITAMINS: ICD-10-CM

## 2025-05-29 PROCEDURE — 96372 THER/PROPH/DIAG INJ SC/IM: CPT

## 2025-05-29 RX ORDER — CYANOCOBALAMIN 1000 UG/ML
1000 INJECTION, SOLUTION INTRAMUSCULAR; SUBCUTANEOUS
Qty: 0 | Refills: 0 | Status: COMPLETED | OUTPATIENT
Start: 2025-05-29

## 2025-06-26 ENCOUNTER — APPOINTMENT (OUTPATIENT)
Dept: INTERNAL MEDICINE | Facility: CLINIC | Age: 65
End: 2025-06-26
Payer: MEDICARE

## 2025-06-26 ENCOUNTER — TRANSCRIPTION ENCOUNTER (OUTPATIENT)
Age: 65
End: 2025-06-26

## 2025-06-26 PROCEDURE — 96372 THER/PROPH/DIAG INJ SC/IM: CPT

## 2025-06-26 RX ORDER — CYANOCOBALAMIN 1000 UG/ML
1000 INJECTION, SOLUTION INTRAMUSCULAR; SUBCUTANEOUS
Qty: 0 | Refills: 0 | Status: COMPLETED | OUTPATIENT
Start: 2025-06-26

## 2025-06-27 LAB
ALBUMIN SERPL ELPH-MCNC: 4.7 G/DL
ALP BLD-CCNC: 85 U/L
ALT SERPL-CCNC: 31 U/L
ANION GAP SERPL CALC-SCNC: 14 MMOL/L
AST SERPL-CCNC: 30 U/L
BASOPHILS # BLD AUTO: 0.03 K/UL
BASOPHILS NFR BLD AUTO: 0.8 %
BILIRUB SERPL-MCNC: 0.9 MG/DL
BUN SERPL-MCNC: 14 MG/DL
CALCIUM SERPL-MCNC: 10.5 MG/DL
CHLORIDE SERPL-SCNC: 107 MMOL/L
CO2 SERPL-SCNC: 23 MMOL/L
CREAT SERPL-MCNC: 0.77 MG/DL
CRP SERPL-MCNC: <3 MG/L
EGFRCR SERPLBLD CKD-EPI 2021: 85 ML/MIN/1.73M2
EOSINOPHIL # BLD AUTO: 0.17 K/UL
EOSINOPHIL NFR BLD AUTO: 4.7 %
ERYTHROCYTE [SEDIMENTATION RATE] IN BLOOD BY WESTERGREN METHOD: 3 MM/HR
GLUCOSE SERPL-MCNC: 84 MG/DL
HCT VFR BLD CALC: 43.5 %
HGB BLD-MCNC: 13.7 G/DL
IMM GRANULOCYTES NFR BLD AUTO: 0 %
LYMPHOCYTES # BLD AUTO: 1.84 K/UL
LYMPHOCYTES NFR BLD AUTO: 51.1 %
MAN DIFF?: NORMAL
MCHC RBC-ENTMCNC: 31.1 PG
MCHC RBC-ENTMCNC: 31.5 G/DL
MCV RBC AUTO: 98.6 FL
MONOCYTES # BLD AUTO: 0.22 K/UL
MONOCYTES NFR BLD AUTO: 6.1 %
NEUTROPHILS # BLD AUTO: 1.34 K/UL
NEUTROPHILS NFR BLD AUTO: 37.3 %
PLATELET # BLD AUTO: 179 K/UL
POTASSIUM SERPL-SCNC: 4.4 MMOL/L
PROT SERPL-MCNC: 6.7 G/DL
RBC # BLD: 4.41 M/UL
RBC # FLD: 13.5 %
SODIUM SERPL-SCNC: 143 MMOL/L
VIT B12 SERPL-MCNC: 231 PG/ML
WBC # FLD AUTO: 3.6 K/UL

## 2025-07-02 ENCOUNTER — APPOINTMENT (OUTPATIENT)
Dept: INTERNAL MEDICINE | Facility: CLINIC | Age: 65
End: 2025-07-02

## 2025-07-10 ENCOUNTER — APPOINTMENT (OUTPATIENT)
Dept: INTERNAL MEDICINE | Facility: CLINIC | Age: 65
End: 2025-07-10
Payer: MEDICARE

## 2025-07-10 PROCEDURE — 96372 THER/PROPH/DIAG INJ SC/IM: CPT

## 2025-07-11 RX ORDER — CYANOCOBALAMIN 1000 UG/ML
1000 INJECTION, SOLUTION INTRAMUSCULAR; SUBCUTANEOUS
Qty: 0 | Refills: 0 | Status: COMPLETED | OUTPATIENT
Start: 2025-07-10

## 2025-07-24 ENCOUNTER — APPOINTMENT (OUTPATIENT)
Dept: INTERNAL MEDICINE | Facility: CLINIC | Age: 65
End: 2025-07-24
Payer: MEDICARE

## 2025-07-24 PROCEDURE — 96372 THER/PROPH/DIAG INJ SC/IM: CPT

## 2025-07-24 RX ORDER — CYANOCOBALAMIN 1000 UG/ML
1000 INJECTION, SOLUTION INTRAMUSCULAR; SUBCUTANEOUS
Qty: 0 | Refills: 0 | Status: COMPLETED | OUTPATIENT
Start: 2025-07-24

## 2025-08-06 ENCOUNTER — APPOINTMENT (OUTPATIENT)
Dept: INTERNAL MEDICINE | Facility: CLINIC | Age: 65
End: 2025-08-06
Payer: MEDICARE

## 2025-08-06 PROCEDURE — 96372 THER/PROPH/DIAG INJ SC/IM: CPT

## 2025-08-07 RX ORDER — CYANOCOBALAMIN 1000 UG/ML
1000 INJECTION, SOLUTION INTRAMUSCULAR; SUBCUTANEOUS
Qty: 0 | Refills: 0 | Status: COMPLETED | OUTPATIENT
Start: 2025-08-06

## 2025-08-20 ENCOUNTER — APPOINTMENT (OUTPATIENT)
Dept: INTERNAL MEDICINE | Facility: CLINIC | Age: 65
End: 2025-08-20
Payer: MEDICARE

## 2025-08-20 PROCEDURE — 96372 THER/PROPH/DIAG INJ SC/IM: CPT

## 2025-08-20 RX ORDER — CYANOCOBALAMIN 1000 UG/ML
1000 INJECTION, SOLUTION INTRAMUSCULAR; SUBCUTANEOUS
Qty: 0 | Refills: 0 | Status: COMPLETED | OUTPATIENT
Start: 2025-08-20

## 2025-08-20 RX ADMIN — CYANOCOBALAMIN 0 MCG/ML: 1000 INJECTION, SOLUTION INTRAMUSCULAR at 00:00

## 2025-08-21 LAB
FOLATE SERPL-MCNC: 8.6 NG/ML
VIT B12 SERPL-MCNC: 359 PG/ML

## 2025-09-03 ENCOUNTER — APPOINTMENT (OUTPATIENT)
Dept: INTERNAL MEDICINE | Facility: CLINIC | Age: 65
End: 2025-09-03
Payer: MEDICARE

## 2025-09-03 PROCEDURE — 96372 THER/PROPH/DIAG INJ SC/IM: CPT

## 2025-09-03 RX ORDER — CYANOCOBALAMIN 1000 UG/ML
1000 INJECTION, SOLUTION INTRAMUSCULAR; SUBCUTANEOUS
Qty: 0 | Refills: 0 | Status: COMPLETED | OUTPATIENT
Start: 2025-09-03

## 2025-09-17 ENCOUNTER — APPOINTMENT (OUTPATIENT)
Dept: INTERNAL MEDICINE | Facility: CLINIC | Age: 65
End: 2025-09-17
Payer: MEDICARE

## 2025-09-17 PROCEDURE — 96372 THER/PROPH/DIAG INJ SC/IM: CPT

## 2025-09-17 RX ORDER — CYANOCOBALAMIN 1000 UG/ML
1000 INJECTION, SOLUTION INTRAMUSCULAR; SUBCUTANEOUS
Qty: 0 | Refills: 0 | Status: COMPLETED | OUTPATIENT
Start: 2025-09-17

## 2025-09-17 RX ADMIN — CYANOCOBALAMIN 0 MCG/ML: 1000 INJECTION, SOLUTION INTRAMUSCULAR at 00:00

## 2025-09-20 ENCOUNTER — NON-APPOINTMENT (OUTPATIENT)
Age: 65
End: 2025-09-20

## 2025-09-22 PROBLEM — K80.50 CHOLEDOCHOLITHIASIS: Status: ACTIVE | Noted: 2025-09-22

## 2025-09-24 PROBLEM — Z71.9 ENCOUNTER FOR CONSULTATION: Status: ACTIVE | Noted: 2025-09-24
